# Patient Record
Sex: FEMALE | Race: WHITE
[De-identification: names, ages, dates, MRNs, and addresses within clinical notes are randomized per-mention and may not be internally consistent; named-entity substitution may affect disease eponyms.]

---

## 2018-04-25 ENCOUNTER — HOSPITAL ENCOUNTER (OUTPATIENT)
Dept: HOSPITAL 58 - RAD | Age: 48
Discharge: HOME | End: 2018-04-25
Attending: SPECIALIST

## 2018-04-25 DIAGNOSIS — G89.29: ICD-10-CM

## 2018-04-25 DIAGNOSIS — M54.17: ICD-10-CM

## 2018-04-25 DIAGNOSIS — M25.572: ICD-10-CM

## 2018-04-25 DIAGNOSIS — M25.50: ICD-10-CM

## 2018-04-25 DIAGNOSIS — M79.7: Primary | ICD-10-CM

## 2018-04-25 DIAGNOSIS — E66.9: ICD-10-CM

## 2018-04-25 DIAGNOSIS — M79.89: ICD-10-CM

## 2018-04-25 PROCEDURE — 86038 ANTINUCLEAR ANTIBODIES: CPT

## 2018-04-25 PROCEDURE — 36415 COLL VENOUS BLD VENIPUNCTURE: CPT

## 2018-04-25 PROCEDURE — 85025 COMPLETE CBC W/AUTO DIFF WBC: CPT

## 2018-04-25 PROCEDURE — 86430 RHEUMATOID FACTOR TEST QUAL: CPT

## 2018-04-25 PROCEDURE — 80061 LIPID PANEL: CPT

## 2018-04-25 PROCEDURE — 80053 COMPREHEN METABOLIC PANEL: CPT

## 2018-04-25 NOTE — DI
EXAM:  Five views of the lumbar spine. 

  

History:  Lumbar radiculopathy. 

  

Findings:  No acute fracture or subluxation of the lumbar spine.  Mild to moderate multilevel degener
ative disc space narrowing with endplate sclerosis and small osteophyte formation. 

  

Impression: 

1.  No acute osseous abnormality. 

2.  Mild degenerative disc disease

## 2018-04-25 NOTE — DI
EXAM:  Three views of the left ankle. 

  

History:  Left ankle pain. 

  

Findings:  No acute fracture or dislocation.  No abnormal calcifications or radiopaque foreign bodies
.  Calcaneal enthesiopathy.  Joint spaces are relatively preserved. 

  

Impression: 

1.  No acute osseous abnormality. 

2.  Calcaneal enthesiopathy

## 2018-04-25 NOTE — DI
EXAM:  Four views of the left knee. 

  

History:  Left knee pain. 

  

Findings:  No acute fracture or dislocation.  Moderate narrowing of the medial compartment.  Mild to 
moderate narrowing of the lateral and patellofemoral compartments with osteophyte formation.  No abno
rmal calcifications or radiopaque foreign bodies. 

  

Impression: 

1.  No acute osseous abnormality. 

2.  Tricompartmental osteoarthritis

## 2018-06-04 ENCOUNTER — HOSPITAL ENCOUNTER (OUTPATIENT)
Dept: HOSPITAL 58 - FCC-LAB | Age: 48
Discharge: HOME | End: 2018-06-04
Attending: SPECIALIST

## 2018-06-04 DIAGNOSIS — E66.9: ICD-10-CM

## 2018-06-04 DIAGNOSIS — Z79.899: ICD-10-CM

## 2018-06-04 DIAGNOSIS — M54.17: Primary | ICD-10-CM

## 2018-06-04 PROCEDURE — 81001 URINALYSIS AUTO W/SCOPE: CPT

## 2018-06-04 PROCEDURE — 36415 COLL VENOUS BLD VENIPUNCTURE: CPT

## 2018-06-04 PROCEDURE — 80053 COMPREHEN METABOLIC PANEL: CPT

## 2018-06-04 PROCEDURE — 87086 URINE CULTURE/COLONY COUNT: CPT

## 2018-07-10 ENCOUNTER — TRANSCRIBE ORDERS (OUTPATIENT)
Dept: ADMINISTRATIVE | Facility: HOSPITAL | Age: 48
End: 2018-07-10

## 2018-07-10 DIAGNOSIS — M23.92 DERANGEMENT OF COLLATERAL LIGAMENT OF LEFT KNEE: Primary | ICD-10-CM

## 2018-07-16 ENCOUNTER — HOSPITAL ENCOUNTER (OUTPATIENT)
Dept: MRI IMAGING | Facility: HOSPITAL | Age: 48
Discharge: HOME | End: 2018-07-16
Attending: ORTHOPAEDIC SURGERY | Admitting: ORTHOPAEDIC SURGERY

## 2018-07-16 DIAGNOSIS — M23.92 DERANGEMENT OF COLLATERAL LIGAMENT OF LEFT KNEE: ICD-10-CM

## 2018-07-16 PROCEDURE — 73721 MRI JNT OF LWR EXTRE W/O DYE: CPT

## 2018-08-02 RX ORDER — DICLOFENAC SODIUM 75 MG/1
75 TABLET, DELAYED RELEASE ORAL 2 TIMES DAILY
COMMUNITY

## 2018-08-02 RX ORDER — TRAMADOL HYDROCHLORIDE 50 MG/1
50 TABLET ORAL EVERY 8 HOURS PRN
COMMUNITY
End: 2018-09-02 | Stop reason: HOSPADM

## 2018-08-02 RX ORDER — CYCLOBENZAPRINE HCL 10 MG
10 TABLET ORAL NIGHTLY
COMMUNITY

## 2018-08-02 RX ORDER — TRAZODONE HYDROCHLORIDE 50 MG/1
50 TABLET ORAL NIGHTLY
COMMUNITY

## 2018-08-02 RX ORDER — PREGABALIN 50 MG/1
50 CAPSULE ORAL DAILY
COMMUNITY

## 2018-08-02 RX ORDER — PHENTERMINE HYDROCHLORIDE 37.5 MG/1
37.5 CAPSULE ORAL EVERY MORNING
COMMUNITY

## 2018-08-02 RX ORDER — NYSTATIN 100000 [USP'U]/G
1 POWDER TOPICAL DAILY PRN
COMMUNITY

## 2018-08-06 NOTE — DISCHARGE INSTRUCTIONS
DAY OF SURGERY INSTRUCTIONS        YOUR SURGEON:   DR. SVETLANA HINDS    PROCEDURE: LEFT TOTAL HIP ARTHROPLASTY    DATE OF SURGERY:   AUGUST 14, 2018    ARRIVAL TIME: AS DIRECTED BY OFFICE    DAY OF SURGERY TAKE ONLY THESE MEDICATIONS UNLESS OTHERWISE INSTRUCTED BY YOUR PHYSICIAN:    TRAMADOL (ULTRAM)              MANAGING PAIN AFTER SURGERY    We know you are probably wondering what your pain will be like after surgery.  Following surgery it is unrealistic to expect you will not have pain.   Pain is how our bodies let us know that something is wrong or cautions us to be careful.  That said, our goal is to make your pain tolerable.    Methods we may use to treat your pain include (oral or IV medications, PCAs, epidurals, nerve blocks, etc.)   While some procedures require IV pain medications for a short time after surgery, transitioning to pain medications by mouth allows for better management of pain.   Your nurse will encourage you to take oral pain medications whenever possible.  IV medications work almost immediately, but only last a short while.  Taking medications by mouth allows for a more constant level of medication in your blood stream for a longer period of time.      Once your pain is out of control it is harder to get back under control.  It is important you are aware when your next dose of pain medication is due.  If you are admitted, your nurse may write the time of your next dose on the white board in your room to help you remember.      We are interested in your pain and encourage you to inform us about aggravating factors during your visit.   Many times a simple repositioning every few hours can make a big difference.    If your physician says it is okay, do not let your pain prevent you from getting out of bed. Be sure to call your nurse for assistance prior to getting up so you do not fall.      Before surgery, please decide your tolerable pain goal.  These faces help describe the pain ratings  we use on a 0-10 scale.   Be prepared to tell us your goal and whether or not you take pain or anxiety medications at home.            BEFORE YOU COME TO THE HOSPITAL  (Pre-op instructions)  • Do not eat, drink, smoke or chew gum after midnight the night before surgery.  This also includes no mints.  • Morning of surgery take only the medicines you have been instructed with a sip of water unless otherwise instructed  by your physician.  • Do not shave, wear makeup or dark nail polish.  • Remove all jewelry including rings.  • Leave anything you consider valuable at home.  • Leave your suitcase in the car until after your surgery.  • Bring the following with you if applicable:  o Picture ID and insurance, Medicare or Medicaid cards  o Co-pay/deductible required by insurance (cash, check, credit card)  o Copy of advance directive, living will or power-of- documents if not brought to PAT  o CPAP or BIPAP mask and tubing  o Relaxation aids (MP3 player, book, magazine)  • On the day of surgery check in at registration located at the main entrance of the hospital.      Outpatient Surgery Guidelines, Adult  Outpatient procedures are those for which the person having the procedure is allowed to go home the same day as the procedure. Various procedures are done on an outpatient basis. You should follow some general guidelines if you will be having an outpatient procedure.  LET YOUR HEALTH CARE PROVIDER KNOW ABOUT:  · Any allergies you have.  · All medicines you are taking, including vitamins, herbs, eye drops, creams, and over-the-counter medicines.  · Previous problems you or members of your family have had with the use of anesthetics.  · Any blood disorders you have.  · Previous surgeries you have had.  · Medical conditions you have.  RISKS AND COMPLICATIONS  Your health care provider will discuss possible risks and complications with you before surgery. Common risks and complications include:    · Problems due to  the use of anesthetics.  · Blood loss and replacement (does not apply to minor surgical procedures).  · Temporary increase in pain due to surgery.  · Uncorrected pain or problems that the surgery was meant to correct.  · Infection.  · New damage.  BEFORE THE PROCEDURE  · Ask your health care provider about changing or stopping your regular medicines. You may need to stop taking certain medicines in the days or weeks before the procedure.  · Stop smoking at least 2 weeks before surgery. This lowers your risk for complications during and after surgery. Ask your health care provider for help with this if needed.  · Eat your usual meals and a light supper the day before surgery. Continue fluid intake. Do not drink alcohol.  · Do not eat or drink after midnight the night before your surgery.   · Arrange for someone to take you home and to stay with you for 24 hours after the procedure. Medicine given for your procedure may affect your ability to drive or to care for yourself.  · Call your health care provider's office if you develop an illness or problem that may prevent you from safely having your procedure.  AFTER THE PROCEDURE  After surgery, you will be taken to a recovery area, where your progress will be monitored. If there are no complications, you will be allowed to go home when you are awake, stable, and taking fluids well. You may have numbness around the surgical site. Healing will take some time. You will have tenderness at the surgical site and may have some swelling and bruising. You may also have some nausea.  HOME CARE INSTRUCTIONS  · Do not drive for 24 hours, or as directed by your health care provider. Do not drive while taking prescription pain medicines.  · Do not drink alcohol for 24 hours.  · Do not make important decisions or sign legal documents for 24 hours.  · You may resume a normal diet and activities as directed.  · Do not lift anything heavier than 10 pounds (4.5 kg) or play contact sports  until your health care provider says it is okay.  · Change your bandages (dressings) as directed.  · Only take over-the-counter or prescription medicines as directed by your health care provider.  · Follow up with your health care provider as directed.  SEEK MEDICAL CARE IF:  · You have increased bleeding (more than a small spot) from the surgical site.  · You have redness, swelling, or increasing pain in the wound.  · You see pus coming from the wound.  · You have a fever.  · You notice a bad smell coming from the wound or dressing.  · You feel lightheaded or faint.  · You develop a rash.  · You have trouble breathing.  · You develop allergies.  MAKE SURE YOU:  · Understand these instructions.  · Will watch your condition.  · Will get help right away if you are not doing well or get worse.     This information is not intended to replace advice given to you by your health care provider. Make sure you discuss any questions you have with your health care provider.     Document Released: 09/12/2002 Document Revised: 05/03/2016 Document Reviewed: 05/22/2014  [employer] Interactive Patient Education ©2016 [employer]       Fall Prevention in Hospitals, Adult  As a hospital patient, your condition and the treatments you receive can increase your risk for falls. Some additional risk factors for falls in a hospital include:  · Being in an unfamiliar environment.  · Being on bed rest.  · Your surgery.  · Taking certain medicines.  · Your tubing requirements, such as intravenous (IV) therapy or catheters.  It is important that you learn how to decrease fall risks while at the hospital. Below are important tips that can help prevent falls.  SAFETY TIPS FOR PREVENTING FALLS  Talk about your risk of falling.  · Ask your health care provider why you are at risk for falling. Is it your medicine, illness, tubing placement, or something else?  · Make a plan with your health care provider to keep you safe from falls.  · Ask your  health care provider or pharmacist about side effects of your medicines. Some medicines can make you dizzy or affect your coordination.  Ask for help.  · Ask for help before getting out of bed. You may need to press your call button.  · Ask for assistance in getting safely to the toilet.  · Ask for a walker or cane to be put at your bedside. Ask that most of the side rails on your bed be placed up before your health care provider leaves the room.  · Ask family or friends to sit with you.  · Ask for things that are out of your reach, such as your glasses, hearing aids, telephone, bedside table, or call button.  Follow these tips to avoid falling:  · Stay lying or seated, rather than standing, while waiting for help.  · Wear rubber-soled slippers or shoes whenever you walk in the hospital.  · Avoid quick, sudden movements.  ¨ Change positions slowly.  ¨ Sit on the side of your bed before standing.  ¨ Stand up slowly and wait before you start to walk.  · Let your health care provider know if there is a spill on the floor.  · Pay careful attention to the medical equipment, electrical cords, and tubes around you.  · When you need help, use your call button by your bed or in the bathroom. Wait for one of your health care providers to help you.  · If you feel dizzy or unsure of your footing, return to bed and wait for assistance.  · Avoid being distracted by the TV, telephone, or another person in your room.  · Do not lean or support yourself on rolling objects, such as IV poles or bedside tables.     This information is not intended to replace advice given to you by your health care provider. Make sure you discuss any questions you have with your health care provider.     Document Released: 12/15/2001 Document Revised: 01/08/2016 Document Reviewed: 08/25/2013  [employer] Interactive Patient Education ©2016 [employer]     .     Bactroban Nasal Ointment  If your physician ordered Bactroban nasal ointment please follow  these instructions.   Use:  Morning and night on the day before your surgery and the morning of your surgery.  How to use:  Assure the tube is open; squirt on Q tip provided and swab thoroughly in each nostril.  Why:  To reduce the bacteria count in your nose.      Preparing the Skin Before Surgery  Preparing or “prepping” skin before surgery can reduce the risk of infection at the surgical site. To make the process easier, [facility] has chosen  4% Chlorhexidine Gluconate (CHG) antiseptic solution.   The steps below outline the prepping process and should be carefully followed.                                                                                                                                                      Prep the skin at the following time(s):                                                        We recommend you shower the night before surgery, and again the morning of surgery with the 4% CHG antiseptic solution using half of the bottle each time.  Dress in clean clothes/sleepwear after showering.  See instructions below for application.              Do not apply any lotions or moisturizers.           Do not shave the area to be prepped for at least 2 days prior to surgery.        Clipping the hair may be done immediately prior to your surgery at the hospital if needed.    Directions:  Thoroughly rinse your body with water.  Apply 4% CHG to a cloth and wash skin gently, paying special attention to the operative site.  Rinse again thoroughly.   Once you have begun using this product do not apply anything else to your skin. If itching or redness persists, rinse affected areas and discontinue use.    When using this product:  • Keep out of eyes, ears, and mouth.  • If solution should contact these areas, rinse out promptly and thoroughly with water.  • For external use only.  • Do not use in genital area, on your face or head.    ____________       PATIENT/FAMILY/RESPONSIBLE PARTY VERBALIZES  UNDERSTANDING OF ABOVE EDUCATION.  COPY OF PAIN SCALE GIVEN AND REVIEWED WITH VERBALIZED UNDERSTANDING.

## 2018-08-07 ENCOUNTER — APPOINTMENT (OUTPATIENT)
Dept: PREADMISSION TESTING | Facility: HOSPITAL | Age: 48
End: 2018-08-07

## 2018-08-07 ENCOUNTER — HOSPITAL ENCOUNTER (OUTPATIENT)
Dept: GENERAL RADIOLOGY | Facility: HOSPITAL | Age: 48
Discharge: HOME | End: 2018-08-07
Admitting: ORTHOPAEDIC SURGERY

## 2018-08-07 VITALS
HEART RATE: 67 BPM | HEIGHT: 62 IN | WEIGHT: 258.82 LBS | OXYGEN SATURATION: 97 % | BODY MASS INDEX: 47.63 KG/M2 | DIASTOLIC BLOOD PRESSURE: 86 MMHG | SYSTOLIC BLOOD PRESSURE: 144 MMHG | RESPIRATION RATE: 18 BRPM

## 2018-08-07 LAB
ALBUMIN SERPL-MCNC: 4.1 G/DL (ref 3.5–5)
ALBUMIN/GLOB SERPL: 1.2 G/DL (ref 1.1–2.5)
ALP SERPL-CCNC: 102 U/L (ref 24–120)
ALT SERPL W P-5'-P-CCNC: 22 U/L (ref 0–54)
ANION GAP SERPL CALCULATED.3IONS-SCNC: 9 MMOL/L (ref 4–13)
AST SERPL-CCNC: 20 U/L (ref 7–45)
BACTERIA UR QL AUTO: ABNORMAL /HPF
BILIRUB SERPL-MCNC: 0.4 MG/DL (ref 0.1–1)
BILIRUB UR QL STRIP: NEGATIVE
BUN BLD-MCNC: 13 MG/DL (ref 5–21)
BUN/CREAT SERPL: 15.3 (ref 7–25)
CALCIUM SPEC-SCNC: 8.7 MG/DL (ref 8.4–10.4)
CHLORIDE SERPL-SCNC: 105 MMOL/L (ref 98–110)
CLARITY UR: CLEAR
CO2 SERPL-SCNC: 31 MMOL/L (ref 24–31)
COLOR UR: YELLOW
CREAT BLD-MCNC: 0.85 MG/DL (ref 0.5–1.4)
DEPRECATED RDW RBC AUTO: 42.5 FL (ref 40–54)
ERYTHROCYTE [DISTWIDTH] IN BLOOD BY AUTOMATED COUNT: 13.6 % (ref 12–15)
GFR SERPL CREATININE-BSD FRML MDRD: 71 ML/MIN/1.73
GLOBULIN UR ELPH-MCNC: 3.3 GM/DL
GLUCOSE BLD-MCNC: 108 MG/DL (ref 70–100)
GLUCOSE UR STRIP-MCNC: NEGATIVE MG/DL
HCT VFR BLD AUTO: 41.2 % (ref 37–47)
HGB BLD-MCNC: 13.1 G/DL (ref 12–16)
HGB UR QL STRIP.AUTO: NEGATIVE
HYALINE CASTS UR QL AUTO: ABNORMAL /LPF
INR PPP: 0.92 (ref 0.91–1.09)
KETONES UR QL STRIP: NEGATIVE
LEUKOCYTE ESTERASE UR QL STRIP.AUTO: ABNORMAL
MCH RBC QN AUTO: 27.3 PG (ref 28–32)
MCHC RBC AUTO-ENTMCNC: 31.8 G/DL (ref 33–36)
MCV RBC AUTO: 86 FL (ref 82–98)
NITRITE UR QL STRIP: NEGATIVE
PH UR STRIP.AUTO: 6 [PH] (ref 5–8)
PLATELET # BLD AUTO: 218 10*3/MM3 (ref 130–400)
PMV BLD AUTO: 10.5 FL (ref 6–12)
POTASSIUM BLD-SCNC: 4 MMOL/L (ref 3.5–5.3)
PROT SERPL-MCNC: 7.4 G/DL (ref 6.3–8.7)
PROT UR QL STRIP: NEGATIVE
PROTHROMBIN TIME: 12.6 SECONDS (ref 11.9–14.6)
RBC # BLD AUTO: 4.79 10*6/MM3 (ref 4.2–5.4)
RBC # UR: ABNORMAL /HPF
REF LAB TEST METHOD: ABNORMAL
SODIUM BLD-SCNC: 145 MMOL/L (ref 135–145)
SP GR UR STRIP: 1.02 (ref 1–1.03)
SQUAMOUS #/AREA URNS HPF: ABNORMAL /HPF
UROBILINOGEN UR QL STRIP: ABNORMAL
WBC NRBC COR # BLD: 7.93 10*3/MM3 (ref 4.8–10.8)
WBC UR QL AUTO: ABNORMAL /HPF

## 2018-08-07 PROCEDURE — 87086 URINE CULTURE/COLONY COUNT: CPT | Performed by: ORTHOPAEDIC SURGERY

## 2018-08-07 PROCEDURE — 85610 PROTHROMBIN TIME: CPT | Performed by: ORTHOPAEDIC SURGERY

## 2018-08-07 PROCEDURE — 85027 COMPLETE CBC AUTOMATED: CPT | Performed by: ORTHOPAEDIC SURGERY

## 2018-08-07 PROCEDURE — 93010 ELECTROCARDIOGRAM REPORT: CPT | Performed by: INTERNAL MEDICINE

## 2018-08-07 PROCEDURE — 80053 COMPREHEN METABOLIC PANEL: CPT | Performed by: ORTHOPAEDIC SURGERY

## 2018-08-07 PROCEDURE — 71046 X-RAY EXAM CHEST 2 VIEWS: CPT

## 2018-08-07 PROCEDURE — 93005 ELECTROCARDIOGRAM TRACING: CPT

## 2018-08-07 PROCEDURE — 81001 URINALYSIS AUTO W/SCOPE: CPT | Performed by: ORTHOPAEDIC SURGERY

## 2018-08-07 PROCEDURE — 36415 COLL VENOUS BLD VENIPUNCTURE: CPT

## 2018-08-07 ASSESSMENT — KOOS JR
KOOS JR SCORE: 31.307
KOOS JR SCORE: 22

## 2018-08-07 NOTE — PAT
PATIENT  ATTENDED TOTAL JOINT REPLACEMENT CLASS.  EDUCATION INCLUDED:  PAIN SCALE, INCENTIVE SPIROMETRY, DVT PREVENTION,  TURN-COUGH-DEEP BREATHING,  USE OF BACTROBAN AND CHG WASH.  PATIENT VERBALIZED UNDERSTANDING.

## 2018-08-09 LAB — BACTERIA SPEC AEROBE CULT: ABNORMAL

## 2018-08-14 ENCOUNTER — HOSPITAL ENCOUNTER (OUTPATIENT)
Facility: HOSPITAL | Age: 48
Setting detail: SURGERY ADMIT
Discharge: HOME | End: 2018-08-14
Attending: ORTHOPAEDIC SURGERY | Admitting: ORTHOPAEDIC SURGERY

## 2018-08-14 VITALS
DIASTOLIC BLOOD PRESSURE: 90 MMHG | TEMPERATURE: 97.2 F | OXYGEN SATURATION: 96 % | RESPIRATION RATE: 20 BRPM | HEART RATE: 79 BPM | SYSTOLIC BLOOD PRESSURE: 135 MMHG

## 2018-08-14 LAB
ABO GROUP BLD: NORMAL
BLD GP AB SCN SERPL QL: NEGATIVE
RH BLD: POSITIVE
T&S EXPIRATION DATE: NORMAL

## 2018-08-14 PROCEDURE — 86850 RBC ANTIBODY SCREEN: CPT | Performed by: ORTHOPAEDIC SURGERY

## 2018-08-14 PROCEDURE — 86900 BLOOD TYPING SEROLOGIC ABO: CPT | Performed by: ORTHOPAEDIC SURGERY

## 2018-08-14 PROCEDURE — 86901 BLOOD TYPING SEROLOGIC RH(D): CPT | Performed by: ORTHOPAEDIC SURGERY

## 2018-08-14 RX ORDER — SODIUM CHLORIDE 0.9 % (FLUSH) 0.9 %
1-10 SYRINGE (ML) INJECTION AS NEEDED
Status: DISCONTINUED | OUTPATIENT
Start: 2018-08-14 | End: 2018-08-14 | Stop reason: HOSPADM

## 2018-08-14 RX ORDER — SODIUM CHLORIDE, SODIUM LACTATE, POTASSIUM CHLORIDE, CALCIUM CHLORIDE 600; 310; 30; 20 MG/100ML; MG/100ML; MG/100ML; MG/100ML
100 INJECTION, SOLUTION INTRAVENOUS CONTINUOUS PRN
Status: DISCONTINUED | OUTPATIENT
Start: 2018-08-14 | End: 2018-08-14 | Stop reason: HOSPADM

## 2018-08-14 RX ADMIN — LIDOCAINE HYDROCHLORIDE 0.5 ML: 10 INJECTION, SOLUTION EPIDURAL; INFILTRATION; INTRACAUDAL; PERINEURAL at 12:54

## 2018-08-14 RX ADMIN — SODIUM CHLORIDE, POTASSIUM CHLORIDE, SODIUM LACTATE AND CALCIUM CHLORIDE 100 ML/HR: 600; 310; 30; 20 INJECTION, SOLUTION INTRAVENOUS at 12:54

## 2018-08-30 RX ORDER — HYDROCODONE BITARTRATE AND ACETAMINOPHEN 7.5; 325 MG/1; MG/1
1 TABLET ORAL EVERY 8 HOURS PRN
Status: ON HOLD | COMMUNITY
End: 2018-09-02

## 2018-08-31 ENCOUNTER — HOSPITAL ENCOUNTER (OUTPATIENT)
Facility: HOSPITAL | Age: 48
Discharge: HOME HEALTH | End: 2018-09-02
Attending: ORTHOPAEDIC SURGERY | Admitting: ORTHOPAEDIC SURGERY

## 2018-08-31 ENCOUNTER — ANESTHESIA EVENT (OUTPATIENT)
Dept: PERIOP | Facility: HOSPITAL | Age: 48
End: 2018-08-31

## 2018-08-31 ENCOUNTER — ANESTHESIA (OUTPATIENT)
Dept: PERIOP | Facility: HOSPITAL | Age: 48
End: 2018-08-31

## 2018-08-31 DIAGNOSIS — M17.12 OSTEOARTHRITIS OF LEFT KNEE: ICD-10-CM

## 2018-08-31 DIAGNOSIS — Z47.1 AFTERCARE FOLLOWING LEFT KNEE JOINT REPLACEMENT SURGERY: ICD-10-CM

## 2018-08-31 DIAGNOSIS — Z96.652 AFTERCARE FOLLOWING LEFT KNEE JOINT REPLACEMENT SURGERY: ICD-10-CM

## 2018-08-31 DIAGNOSIS — M17.12 PRIMARY OSTEOARTHRITIS OF LEFT KNEE: Primary | ICD-10-CM

## 2018-08-31 PROCEDURE — 86901 BLOOD TYPING SEROLOGIC RH(D): CPT | Performed by: ORTHOPAEDIC SURGERY

## 2018-08-31 PROCEDURE — 25010000002 FENTANYL CITRATE (PF) 100 MCG/2ML SOLUTION: Performed by: ANESTHESIOLOGY

## 2018-08-31 PROCEDURE — 25010000002 ONDANSETRON PER 1 MG: Performed by: ORTHOPAEDIC SURGERY

## 2018-08-31 PROCEDURE — 25010000002 FENTANYL CITRATE (PF) 100 MCG/2ML SOLUTION: Performed by: NURSE ANESTHETIST, CERTIFIED REGISTERED

## 2018-08-31 PROCEDURE — 25010000002 VANCOMYCIN PER 500 MG: Performed by: ORTHOPAEDIC SURGERY

## 2018-08-31 PROCEDURE — 25010000002 MORPHINE SULFATE (PF) 2 MG/ML SOLUTION: Performed by: ANESTHESIOLOGY

## 2018-08-31 PROCEDURE — 25010000002 MIDAZOLAM PER 1 MG: Performed by: ANESTHESIOLOGY

## 2018-08-31 PROCEDURE — C1713 ANCHOR/SCREW BN/BN,TIS/BN: HCPCS | Performed by: ORTHOPAEDIC SURGERY

## 2018-08-31 PROCEDURE — 88311 DECALCIFY TISSUE: CPT | Performed by: ORTHOPAEDIC SURGERY

## 2018-08-31 PROCEDURE — 25010000002 ONDANSETRON PER 1 MG: Performed by: ANESTHESIOLOGY

## 2018-08-31 PROCEDURE — 25010000002 ROPIVACAINE PER 1 MG: Performed by: ANESTHESIOLOGY

## 2018-08-31 PROCEDURE — A9270 NON-COVERED ITEM OR SERVICE: HCPCS | Performed by: ANESTHESIOLOGY

## 2018-08-31 PROCEDURE — 25010000002 METOCLOPRAMIDE PER 10 MG: Performed by: ANESTHESIOLOGY

## 2018-08-31 PROCEDURE — 86850 RBC ANTIBODY SCREEN: CPT | Performed by: ORTHOPAEDIC SURGERY

## 2018-08-31 PROCEDURE — 63710000001 OXYCODONE-ACETAMINOPHEN 10-325 MG TABLET: Performed by: ANESTHESIOLOGY

## 2018-08-31 PROCEDURE — 25010000002 PROPOFOL 10 MG/ML EMULSION: Performed by: NURSE ANESTHETIST, CERTIFIED REGISTERED

## 2018-08-31 PROCEDURE — 25010000002 ONDANSETRON PER 1 MG: Performed by: NURSE ANESTHETIST, CERTIFIED REGISTERED

## 2018-08-31 PROCEDURE — 94799 UNLISTED PULMONARY SVC/PX: CPT

## 2018-08-31 PROCEDURE — 88304 TISSUE EXAM BY PATHOLOGIST: CPT | Performed by: ORTHOPAEDIC SURGERY

## 2018-08-31 PROCEDURE — 86900 BLOOD TYPING SEROLOGIC ABO: CPT | Performed by: ORTHOPAEDIC SURGERY

## 2018-08-31 PROCEDURE — 25010000002 SUCCINYLCHOLINE PER 20 MG: Performed by: NURSE ANESTHETIST, CERTIFIED REGISTERED

## 2018-08-31 PROCEDURE — 25010000003 MORPHINE PER 100 MG: Performed by: ORTHOPAEDIC SURGERY

## 2018-08-31 PROCEDURE — C1776 JOINT DEVICE (IMPLANTABLE): HCPCS | Performed by: ORTHOPAEDIC SURGERY

## 2018-08-31 PROCEDURE — 63710000001 ACETAMINOPHEN 500 MG TABLET: Performed by: ANESTHESIOLOGY

## 2018-08-31 DEVICE — IMPLANTABLE DEVICE: Type: IMPLANTABLE DEVICE | Site: KNEE | Status: FUNCTIONAL

## 2018-08-31 DEVICE — IMPLANTABLE DEVICE: Type: IMPLANTABLE DEVICE | Status: FUNCTIONAL

## 2018-08-31 DEVICE — TOTL KN HI DEMAND STRYKER: Type: IMPLANTABLE DEVICE | Status: FUNCTIONAL

## 2018-08-31 DEVICE — CMT BONE SIMPLEX RO FUL DOSE: Type: IMPLANTABLE DEVICE | Site: KNEE | Status: FUNCTIONAL

## 2018-08-31 RX ORDER — NALOXONE HCL 0.4 MG/ML
0.1 VIAL (ML) INJECTION
Status: DISCONTINUED | OUTPATIENT
Start: 2018-08-31 | End: 2018-09-02 | Stop reason: HOSPADM

## 2018-08-31 RX ORDER — HYDRALAZINE HYDROCHLORIDE 20 MG/ML
5 INJECTION INTRAMUSCULAR; INTRAVENOUS
Status: DISCONTINUED | OUTPATIENT
Start: 2018-08-31 | End: 2018-08-31 | Stop reason: HOSPADM

## 2018-08-31 RX ORDER — ASPIRIN 325 MG
325 TABLET ORAL DAILY
Status: DISCONTINUED | OUTPATIENT
Start: 2018-09-01 | End: 2018-09-02 | Stop reason: HOSPADM

## 2018-08-31 RX ORDER — MAGNESIUM HYDROXIDE 1200 MG/15ML
LIQUID ORAL AS NEEDED
Status: DISCONTINUED | OUTPATIENT
Start: 2018-08-31 | End: 2018-08-31 | Stop reason: HOSPADM

## 2018-08-31 RX ORDER — MORPHINE SULFATE 1 MG/ML
INJECTION INTRAVENOUS CONTINUOUS
Status: DISCONTINUED | OUTPATIENT
Start: 2018-08-31 | End: 2018-09-02 | Stop reason: HOSPADM

## 2018-08-31 RX ORDER — PREGABALIN 50 MG/1
50 CAPSULE ORAL DAILY
Status: DISCONTINUED | OUTPATIENT
Start: 2018-09-01 | End: 2018-09-02 | Stop reason: HOSPADM

## 2018-08-31 RX ORDER — LIDOCAINE HYDROCHLORIDE 20 MG/ML
INJECTION, SOLUTION INFILTRATION; PERINEURAL AS NEEDED
Status: DISCONTINUED | OUTPATIENT
Start: 2018-08-31 | End: 2018-08-31 | Stop reason: SURG

## 2018-08-31 RX ORDER — ROCURONIUM BROMIDE 10 MG/ML
INJECTION, SOLUTION INTRAVENOUS AS NEEDED
Status: DISCONTINUED | OUTPATIENT
Start: 2018-08-31 | End: 2018-08-31 | Stop reason: SURG

## 2018-08-31 RX ORDER — TRANEXAMIC ACID 100 MG/ML
INJECTION, SOLUTION INTRAVENOUS AS NEEDED
Status: DISCONTINUED | OUTPATIENT
Start: 2018-08-31 | End: 2018-08-31 | Stop reason: SURG

## 2018-08-31 RX ORDER — METOCLOPRAMIDE HYDROCHLORIDE 5 MG/ML
5 INJECTION INTRAMUSCULAR; INTRAVENOUS
Status: DISCONTINUED | OUTPATIENT
Start: 2018-08-31 | End: 2018-08-31 | Stop reason: HOSPADM

## 2018-08-31 RX ORDER — MIDAZOLAM HYDROCHLORIDE 1 MG/ML
1 INJECTION INTRAMUSCULAR; INTRAVENOUS
Status: DISCONTINUED | OUTPATIENT
Start: 2018-08-31 | End: 2018-08-31 | Stop reason: HOSPADM

## 2018-08-31 RX ORDER — SODIUM CHLORIDE 0.9 % (FLUSH) 0.9 %
1-10 SYRINGE (ML) INJECTION AS NEEDED
Status: DISCONTINUED | OUTPATIENT
Start: 2018-08-31 | End: 2018-08-31 | Stop reason: HOSPADM

## 2018-08-31 RX ORDER — FENTANYL CITRATE 50 UG/ML
25 INJECTION, SOLUTION INTRAMUSCULAR; INTRAVENOUS AS NEEDED
Status: DISCONTINUED | OUTPATIENT
Start: 2018-08-31 | End: 2018-08-31 | Stop reason: HOSPADM

## 2018-08-31 RX ORDER — SODIUM CHLORIDE, SODIUM LACTATE, POTASSIUM CHLORIDE, CALCIUM CHLORIDE 600; 310; 30; 20 MG/100ML; MG/100ML; MG/100ML; MG/100ML
50 INJECTION, SOLUTION INTRAVENOUS CONTINUOUS PRN
Status: DISCONTINUED | OUTPATIENT
Start: 2018-08-31 | End: 2018-09-02 | Stop reason: HOSPADM

## 2018-08-31 RX ORDER — ONDANSETRON 4 MG/1
4 TABLET, ORALLY DISINTEGRATING ORAL EVERY 6 HOURS PRN
Status: DISCONTINUED | OUTPATIENT
Start: 2018-08-31 | End: 2018-09-02 | Stop reason: HOSPADM

## 2018-08-31 RX ORDER — SODIUM CHLORIDE, SODIUM LACTATE, POTASSIUM CHLORIDE, CALCIUM CHLORIDE 600; 310; 30; 20 MG/100ML; MG/100ML; MG/100ML; MG/100ML
100 INJECTION, SOLUTION INTRAVENOUS CONTINUOUS
Status: DISCONTINUED | OUTPATIENT
Start: 2018-08-31 | End: 2018-08-31

## 2018-08-31 RX ORDER — DOCUSATE SODIUM 100 MG/1
100 CAPSULE, LIQUID FILLED ORAL 2 TIMES DAILY PRN
Status: DISCONTINUED | OUTPATIENT
Start: 2018-08-31 | End: 2018-09-02 | Stop reason: HOSPADM

## 2018-08-31 RX ORDER — LABETALOL HYDROCHLORIDE 5 MG/ML
5 INJECTION, SOLUTION INTRAVENOUS
Status: DISCONTINUED | OUTPATIENT
Start: 2018-08-31 | End: 2018-08-31 | Stop reason: HOSPADM

## 2018-08-31 RX ORDER — ONDANSETRON 2 MG/ML
4 INJECTION INTRAMUSCULAR; INTRAVENOUS AS NEEDED
Status: DISCONTINUED | OUTPATIENT
Start: 2018-08-31 | End: 2018-08-31 | Stop reason: HOSPADM

## 2018-08-31 RX ORDER — VANCOMYCIN HYDROCHLORIDE 1 G/200ML
1 INJECTION, SOLUTION INTRAVENOUS ONCE
Status: DISCONTINUED | OUTPATIENT
Start: 2018-08-31 | End: 2018-08-31

## 2018-08-31 RX ORDER — MIDAZOLAM HYDROCHLORIDE 1 MG/ML
2 INJECTION INTRAMUSCULAR; INTRAVENOUS
Status: DISCONTINUED | OUTPATIENT
Start: 2018-08-31 | End: 2018-08-31 | Stop reason: HOSPADM

## 2018-08-31 RX ORDER — ONDANSETRON 2 MG/ML
4 INJECTION INTRAMUSCULAR; INTRAVENOUS EVERY 6 HOURS PRN
Status: DISCONTINUED | OUTPATIENT
Start: 2018-08-31 | End: 2018-09-02 | Stop reason: HOSPADM

## 2018-08-31 RX ORDER — MEPERIDINE HYDROCHLORIDE 25 MG/ML
12.5 INJECTION INTRAMUSCULAR; INTRAVENOUS; SUBCUTANEOUS
Status: DISCONTINUED | OUTPATIENT
Start: 2018-08-31 | End: 2018-08-31 | Stop reason: HOSPADM

## 2018-08-31 RX ORDER — ONDANSETRON 2 MG/ML
INJECTION INTRAMUSCULAR; INTRAVENOUS AS NEEDED
Status: DISCONTINUED | OUTPATIENT
Start: 2018-08-31 | End: 2018-08-31 | Stop reason: SURG

## 2018-08-31 RX ORDER — ONDANSETRON 4 MG/1
4 TABLET, FILM COATED ORAL EVERY 6 HOURS PRN
Status: DISCONTINUED | OUTPATIENT
Start: 2018-08-31 | End: 2018-09-02 | Stop reason: HOSPADM

## 2018-08-31 RX ORDER — SODIUM CHLORIDE, SODIUM LACTATE, POTASSIUM CHLORIDE, CALCIUM CHLORIDE 600; 310; 30; 20 MG/100ML; MG/100ML; MG/100ML; MG/100ML
100 INJECTION, SOLUTION INTRAVENOUS CONTINUOUS PRN
Status: DISCONTINUED | OUTPATIENT
Start: 2018-08-31 | End: 2018-08-31

## 2018-08-31 RX ORDER — FLUMAZENIL 0.1 MG/ML
0.2 INJECTION INTRAVENOUS AS NEEDED
Status: DISCONTINUED | OUTPATIENT
Start: 2018-08-31 | End: 2018-08-31 | Stop reason: HOSPADM

## 2018-08-31 RX ORDER — PROPOFOL 10 MG/ML
VIAL (ML) INTRAVENOUS AS NEEDED
Status: DISCONTINUED | OUTPATIENT
Start: 2018-08-31 | End: 2018-08-31 | Stop reason: SURG

## 2018-08-31 RX ORDER — SUCCINYLCHOLINE CHLORIDE 20 MG/ML
INJECTION INTRAMUSCULAR; INTRAVENOUS AS NEEDED
Status: DISCONTINUED | OUTPATIENT
Start: 2018-08-31 | End: 2018-08-31 | Stop reason: SURG

## 2018-08-31 RX ORDER — MORPHINE SULFATE 2 MG/ML
2 INJECTION, SOLUTION INTRAMUSCULAR; INTRAVENOUS
Status: COMPLETED | OUTPATIENT
Start: 2018-08-31 | End: 2018-08-31

## 2018-08-31 RX ORDER — ROPIVACAINE HYDROCHLORIDE 5 MG/ML
INJECTION, SOLUTION EPIDURAL; INFILTRATION; PERINEURAL AS NEEDED
Status: DISCONTINUED | OUTPATIENT
Start: 2018-08-31 | End: 2018-08-31 | Stop reason: SURG

## 2018-08-31 RX ORDER — HYDROCODONE BITARTRATE AND ACETAMINOPHEN 10; 325 MG/1; MG/1
1 TABLET ORAL EVERY 4 HOURS PRN
Status: DISCONTINUED | OUTPATIENT
Start: 2018-08-31 | End: 2018-09-02 | Stop reason: HOSPADM

## 2018-08-31 RX ORDER — ACETAMINOPHEN 500 MG
1000 TABLET ORAL ONCE
Status: COMPLETED | OUTPATIENT
Start: 2018-08-31 | End: 2018-08-31

## 2018-08-31 RX ORDER — IPRATROPIUM BROMIDE AND ALBUTEROL SULFATE 2.5; .5 MG/3ML; MG/3ML
3 SOLUTION RESPIRATORY (INHALATION) ONCE AS NEEDED
Status: DISCONTINUED | OUTPATIENT
Start: 2018-08-31 | End: 2018-08-31 | Stop reason: HOSPADM

## 2018-08-31 RX ORDER — OXYCODONE AND ACETAMINOPHEN 10; 325 MG/1; MG/1
1 TABLET ORAL ONCE AS NEEDED
Status: COMPLETED | OUTPATIENT
Start: 2018-08-31 | End: 2018-08-31

## 2018-08-31 RX ORDER — FENTANYL CITRATE 50 UG/ML
INJECTION, SOLUTION INTRAMUSCULAR; INTRAVENOUS AS NEEDED
Status: DISCONTINUED | OUTPATIENT
Start: 2018-08-31 | End: 2018-08-31 | Stop reason: SURG

## 2018-08-31 RX ORDER — NALOXONE HCL 0.4 MG/ML
0.04 VIAL (ML) INJECTION AS NEEDED
Status: DISCONTINUED | OUTPATIENT
Start: 2018-08-31 | End: 2018-08-31 | Stop reason: HOSPADM

## 2018-08-31 RX ADMIN — FENTANYL CITRATE 100 MCG: 50 INJECTION, SOLUTION INTRAMUSCULAR; INTRAVENOUS at 11:46

## 2018-08-31 RX ADMIN — METOCLOPRAMIDE 5 MG: 5 INJECTION, SOLUTION INTRAMUSCULAR; INTRAVENOUS at 14:39

## 2018-08-31 RX ADMIN — TRANEXAMIC ACID 1000 MG: 100 INJECTION, SOLUTION INTRAVENOUS at 13:40

## 2018-08-31 RX ADMIN — ROPIVACAINE HYDROCHLORIDE 20 ML: 5 INJECTION, SOLUTION EPIDURAL; INFILTRATION; PERINEURAL at 11:21

## 2018-08-31 RX ADMIN — FENTANYL CITRATE 100 MCG: 50 INJECTION, SOLUTION INTRAMUSCULAR; INTRAVENOUS at 12:38

## 2018-08-31 RX ADMIN — MORPHINE SULFATE: 25 INJECTION, SOLUTION, CONCENTRATE INTRAVENOUS at 22:46

## 2018-08-31 RX ADMIN — TRANEXAMIC ACID 1000 MG: 100 INJECTION, SOLUTION INTRAVENOUS at 12:10

## 2018-08-31 RX ADMIN — MORPHINE SULFATE 2 MG: 2 INJECTION, SOLUTION INTRAMUSCULAR; INTRAVENOUS at 15:15

## 2018-08-31 RX ADMIN — LIDOCAINE HYDROCHLORIDE 0.5 ML: 10 INJECTION, SOLUTION EPIDURAL; INFILTRATION; INTRACAUDAL; PERINEURAL at 10:35

## 2018-08-31 RX ADMIN — SUCCINYLCHOLINE CHLORIDE 120 MG: 20 INJECTION, SOLUTION INTRAMUSCULAR; INTRAVENOUS at 11:48

## 2018-08-31 RX ADMIN — LIDOCAINE HYDROCHLORIDE 50 MG: 20 INJECTION, SOLUTION INFILTRATION; PERINEURAL at 11:48

## 2018-08-31 RX ADMIN — ONDANSETRON 4 MG: 2 INJECTION, SOLUTION INTRAMUSCULAR; INTRAVENOUS at 22:14

## 2018-08-31 RX ADMIN — ROCURONIUM BROMIDE 10 MG: 10 INJECTION INTRAVENOUS at 11:47

## 2018-08-31 RX ADMIN — ONDANSETRON HYDROCHLORIDE 4 MG: 2 INJECTION, SOLUTION INTRAMUSCULAR; INTRAVENOUS at 14:16

## 2018-08-31 RX ADMIN — FENTANYL CITRATE 100 MCG: 50 INJECTION, SOLUTION INTRAMUSCULAR; INTRAVENOUS at 14:35

## 2018-08-31 RX ADMIN — ACETAMINOPHEN 1000 MG: 500 TABLET, FILM COATED ORAL at 11:17

## 2018-08-31 RX ADMIN — MORPHINE SULFATE: 25 INJECTION, SOLUTION, CONCENTRATE INTRAVENOUS at 16:28

## 2018-08-31 RX ADMIN — SODIUM CHLORIDE, POTASSIUM CHLORIDE, SODIUM LACTATE AND CALCIUM CHLORIDE: 600; 310; 30; 20 INJECTION, SOLUTION INTRAVENOUS at 13:05

## 2018-08-31 RX ADMIN — OXYCODONE HYDROCHLORIDE AND ACETAMINOPHEN 1 TABLET: 10; 325 TABLET ORAL at 15:40

## 2018-08-31 RX ADMIN — MORPHINE SULFATE 2 MG: 2 INJECTION, SOLUTION INTRAMUSCULAR; INTRAVENOUS at 15:05

## 2018-08-31 RX ADMIN — MORPHINE SULFATE 2 MG: 2 INJECTION, SOLUTION INTRAMUSCULAR; INTRAVENOUS at 14:45

## 2018-08-31 RX ADMIN — SODIUM CHLORIDE, POTASSIUM CHLORIDE, SODIUM LACTATE AND CALCIUM CHLORIDE 100 ML/HR: 600; 310; 30; 20 INJECTION, SOLUTION INTRAVENOUS at 10:35

## 2018-08-31 RX ADMIN — MORPHINE SULFATE 2 MG: 2 INJECTION, SOLUTION INTRAMUSCULAR; INTRAVENOUS at 14:55

## 2018-08-31 RX ADMIN — FENTANYL CITRATE 100 MCG: 50 INJECTION, SOLUTION INTRAMUSCULAR; INTRAVENOUS at 14:13

## 2018-08-31 RX ADMIN — VANCOMYCIN HYDROCHLORIDE 1000 MG: 1 INJECTION, POWDER, LYOPHILIZED, FOR SOLUTION INTRAVENOUS at 11:39

## 2018-08-31 RX ADMIN — PROPOFOL 150 MG: 10 INJECTION, EMULSION INTRAVENOUS at 11:50

## 2018-08-31 RX ADMIN — MEPERIDINE HYDROCHLORIDE 25 MG: 25 INJECTION INTRAMUSCULAR; INTRAVENOUS; SUBCUTANEOUS at 14:16

## 2018-08-31 RX ADMIN — MIDAZOLAM HYDROCHLORIDE 2 MG: 1 INJECTION, SOLUTION INTRAMUSCULAR; INTRAVENOUS at 11:20

## 2018-08-31 RX ADMIN — MORPHINE SULFATE 2 MG: 2 INJECTION, SOLUTION INTRAMUSCULAR; INTRAVENOUS at 15:25

## 2018-08-31 RX ADMIN — ONDANSETRON HYDROCHLORIDE 4 MG: 2 SOLUTION INTRAMUSCULAR; INTRAVENOUS at 13:50

## 2018-09-01 LAB
HCT VFR BLD AUTO: 36.3 % (ref 37–47)
HGB BLD-MCNC: 11.5 G/DL (ref 12–16)

## 2018-09-01 PROCEDURE — A9270 NON-COVERED ITEM OR SERVICE: HCPCS | Performed by: ORTHOPAEDIC SURGERY

## 2018-09-01 PROCEDURE — 97161 PT EVAL LOW COMPLEX 20 MIN: CPT

## 2018-09-01 PROCEDURE — 85014 HEMATOCRIT: CPT | Performed by: ORTHOPAEDIC SURGERY

## 2018-09-01 PROCEDURE — 97110 THERAPEUTIC EXERCISES: CPT

## 2018-09-01 PROCEDURE — 25010000003 MORPHINE PER 100 MG: Performed by: ORTHOPAEDIC SURGERY

## 2018-09-01 PROCEDURE — 97166 OT EVAL MOD COMPLEX 45 MIN: CPT | Performed by: OCCUPATIONAL THERAPIST

## 2018-09-01 PROCEDURE — 25010000002 ONDANSETRON PER 1 MG: Performed by: ORTHOPAEDIC SURGERY

## 2018-09-01 PROCEDURE — 85018 HEMOGLOBIN: CPT | Performed by: ORTHOPAEDIC SURGERY

## 2018-09-01 PROCEDURE — G8988 SELF CARE GOAL STATUS: HCPCS | Performed by: OCCUPATIONAL THERAPIST

## 2018-09-01 PROCEDURE — 63710000001 PREGABALIN 50 MG CAPSULE: Performed by: ORTHOPAEDIC SURGERY

## 2018-09-01 PROCEDURE — 63710000001 ASPIRIN 325 MG TABLET: Performed by: ORTHOPAEDIC SURGERY

## 2018-09-01 PROCEDURE — G8987 SELF CARE CURRENT STATUS: HCPCS | Performed by: OCCUPATIONAL THERAPIST

## 2018-09-01 PROCEDURE — G8978 MOBILITY CURRENT STATUS: HCPCS

## 2018-09-01 PROCEDURE — 25010000002 VANCOMYCIN PER 500 MG: Performed by: ORTHOPAEDIC SURGERY

## 2018-09-01 PROCEDURE — G8979 MOBILITY GOAL STATUS: HCPCS

## 2018-09-01 PROCEDURE — 97116 GAIT TRAINING THERAPY: CPT

## 2018-09-01 PROCEDURE — 63710000001 HYDROCODONE-ACETAMINOPHEN 10-325 MG TABLET: Performed by: ORTHOPAEDIC SURGERY

## 2018-09-01 RX ADMIN — PREGABALIN 50 MG: 50 CAPSULE ORAL at 08:28

## 2018-09-01 RX ADMIN — MORPHINE SULFATE: 25 INJECTION, SOLUTION, CONCENTRATE INTRAVENOUS at 09:39

## 2018-09-01 RX ADMIN — HYDROCODONE BITARTRATE AND ACETAMINOPHEN 1 TABLET: 10; 325 TABLET ORAL at 04:03

## 2018-09-01 RX ADMIN — HYDROCODONE BITARTRATE AND ACETAMINOPHEN 1 TABLET: 10; 325 TABLET ORAL at 08:28

## 2018-09-01 RX ADMIN — VANCOMYCIN HYDROCHLORIDE 1750 MG: 1 INJECTION, POWDER, LYOPHILIZED, FOR SOLUTION INTRAVENOUS at 00:06

## 2018-09-01 RX ADMIN — HYDROCODONE BITARTRATE AND ACETAMINOPHEN 1 TABLET: 10; 325 TABLET ORAL at 00:06

## 2018-09-01 RX ADMIN — SODIUM CHLORIDE, POTASSIUM CHLORIDE, SODIUM LACTATE AND CALCIUM CHLORIDE 50 ML/HR: 600; 310; 30; 20 INJECTION, SOLUTION INTRAVENOUS at 15:08

## 2018-09-01 RX ADMIN — ONDANSETRON 4 MG: 2 INJECTION, SOLUTION INTRAMUSCULAR; INTRAVENOUS at 08:51

## 2018-09-01 RX ADMIN — ASPIRIN 325 MG: 325 TABLET ORAL at 08:28

## 2018-09-01 RX ADMIN — HYDROCODONE BITARTRATE AND ACETAMINOPHEN 1 TABLET: 10; 325 TABLET ORAL at 13:48

## 2018-09-01 RX ADMIN — HYDROCODONE BITARTRATE AND ACETAMINOPHEN 1 TABLET: 10; 325 TABLET ORAL at 18:42

## 2018-09-01 NOTE — PLAN OF CARE
Problem: Patient Care Overview  Goal: Plan of Care Review  Outcome: Ongoing (interventions implemented as appropriate)   09/01/18 0659   Coping/Psychosocial   Plan of Care Reviewed With patient   Plan of Care Review   Progress no change   OTHER   Outcome Summary Pt c/o moderate to severe pain, PRN PO pain medication given and PCA in use. Dressing to left knee C/D/I. Pt assisted with repositioning. Safety maintained, VSS, will continue to monitor.       Problem: Fall Risk (Adult)  Goal: Identify Related Risk Factors and Signs and Symptoms  Outcome: Outcome(s) achieved Date Met: 09/01/18    Goal: Absence of Fall  Outcome: Ongoing (interventions implemented as appropriate)      Problem: Knee Arthroplasty (Total, Partial) (Adult)  Goal: Signs and Symptoms of Listed Potential Problems Will be Absent, Minimized or Managed (Knee Arthroplasty)  Outcome: Ongoing (interventions implemented as appropriate)

## 2018-09-01 NOTE — THERAPY EVALUATION
Acute Care - Physical Therapy Initial Evaluation  [facility]     Patient Name: Mayda Webster  : 1970  MRN: [id number]  Today's Date: 2018   Onset of Illness/Injury or Date of Surgery: 18  Date of Referral to PT: 18  Referring Physician: Dr. White      Admit Date: 2018    Visit Dx:     ICD-10-CM ICD-9-CM   1. Osteoarthritis of left knee M17.12 715.96   2. Aftercare following left knee joint replacement surgery Z47.1 V54.81    Z96.652 V43.65     Patient Active Problem List   Diagnosis   • Primary osteoarthritis of left knee     Past Medical History:   Diagnosis Date   • Arthritis    • Joint pain    • Lower back pain      Past Surgical History:   Procedure Laterality Date   • CYST REMOVAL Right     Right upper leg   • HYSTERECTOMY          PT ASSESSMENT (last 12 hours)      Physical Therapy Evaluation     Row Name 18 0824          PT Evaluation Time/Intention    Document Type evaluation  -RS     Mode of Treatment physical therapy  -RS     Patient Effort good  -RS     Symptoms Noted During/After Treatment increased pain;fatigue  -RS     Comment patient was nauseated post treatment  -RS     Row Name 18 0824          General Information    Patient Profile Reviewed? yes  -RS     Onset of Illness/Injury or Date of Surgery 18  -RS     Referring Physician Dr. White  -RS     Patient Observations alert;lethargic;agree to therapy  -RS     Equipment Currently Used at Home cane, straight;bath bench;walker, rolling  -RS     Pertinent History of Current Functional Problem Chronic left knee pain with history of falls due to pain and weakness.  L TKA on 2018  -RS     Existing Precautions/Restrictions fall  -RS     Equipment Issued to Patient walker, front wheeled  -RS     Risks Reviewed patient:;LOB;nausea/vomiting;dizziness;increased discomfort;change in vital signs  -RS     Benefits Reviewed patient:;improve function;increase independence;increase strength;increase  balance;decrease pain;decrease risk of DVT  -RS     Barriers to Rehab previous functional deficit  -RS     Row Name 09/01/18 0824          Relationship/Environment    Primary Source of Support/Comfort child(werner)  -RS     Lives With alone  -RS     Family Caregiver if Needed child(werner), adult  -RS     Primary Roles/Responsibilities disabled  -RS     Row Name 09/01/18 0824          Resource/Environmental Concerns    Current Living Arrangements home/apartment/condo  -RS     Resource/Environmental Concerns none  -RS     Row Name 09/01/18 0824          Cognitive Assessment/Interventions    Additional Documentation Cognitive Assessment/Intervention (Group)  -RS     Row Name 09/01/18 0824          Cognitive Assessment/Intervention- PT/OT    Affect/Mental Status (Cognitive) WNL  -RS     Follows Commands (Cognition) WNL  -RS     Cognitive Function (Cognitive) WNL  -RS     Personal Safety Interventions fall prevention program maintained;gait belt;nonskid shoes/slippers when out of bed;supervised activity  -RS     Row Name 09/01/18 0824          Safety Issues, Functional Mobility    Impairments Affecting Function (Mobility) pain;balance;range of motion (ROM);strength;endurance/activity tolerance  -RS     Row Name 09/01/18 0824          Mobility Assessment/Treatment    Extremity Weight-bearing Status left lower extremity  -RS     Left Lower Extremity (Weight-bearing Status) weight-bearing as tolerated (WBAT)  -RS     Row Name 09/01/18 0824          Bed Mobility Assessment/Treatment    Bed Mobility Assessment/Treatment supine-sit;sit-supine  -RS     Supine-Sit [place] (Bed Mobility) verbal cues;minimum assist (75% patient effort);2 person assist  -RS     Sit-Supine [place] (Bed Mobility) verbal cues;minimum assist (75% patient effort);2 person assist  -RS     Bed Mobility, Safety Issues decreased use of legs for bridging/pushing  -RS     Assistive Device (Bed Mobility) head of bed elevated;bed rails  -RS     Comment  (Bed Mobility) HOB elevated supine to sit  -[facility] Name 09/01/18 0824          Transfer Assessment/Treatment    Transfer Assessment/Treatment sit-stand transfer;stand-sit transfer  -     Sit-Stand White (Transfers) verbal cues;minimum assist (75% patient effort);moderate assist (50% patient effort);2 person assist  -RS     Stand-Sit White (Transfers) verbal cues;contact guard;minimum assist (75% patient effort);2 person assist  -[facility] Name 09/01/18 0824          Sit-Stand Transfer    Assistive Device (Sit-Stand Transfers) walker, front-wheeled  -[facility] Name 09/01/18 0824          Stand-Sit Transfer    Assistive Device (Stand-Sit Transfers) walker, front-wheeled  -[facility] Name 09/01/18 0824          Gait/Stairs Assessment/Training    White Level (Gait) not tested  -     Comment (Gait/Stairs) Patient able to stand only during the evaluation due to fatigue, nausea, and hesitation to take steps.    -RS     Row Name 09/01/18 0824          General ROM    LT Lower Ext Lt Knee Extension/Flexion  -     GENERAL ROM COMMENTS WFL in all other extremities  -[facility] Name 09/01/18 0824          Left Lower Ext    Lt Knee Extension/Flexion AROM 0-16-40  -RS     Row Name 09/01/18 0824          MMT (Manual Muscle Testing)    General MMT Comments B UE and R LE 4+/5; L LE not tested formally.  Unable to perform ASLR on left  -[facility] Name 09/01/18 0824          Motor Assessment/Intervention    Additional Documentation Balance (Group);Therapeutic Exercise (Group);Therapeutic Exercise Interventions (Group)  -RS     Row Name 09/01/18 0824          Therapeutic Exercise    50860 - PT Therapeutic Exercise Minutes 14  -RS     Row Name 09/01/18 0824          Therapeutic Exercise    Lower Extremity (Therapeutic Exercise) gastroc stretch, left;other (see comments)  -     Lower Extremity Range of Motion (Therapeutic Exercise) knee flexion/extension, left;ankle dorsiflexion/plantar flexion, left   -RS     Exercise Type (Therapeutic Exercise) static stretching;AROM (active range of motion)  -RS     Position (Therapeutic Exercise) supine   fowlers  -RS     Sets/Reps (Therapeutic Exercise) 2/20  -RS     Equipment (Therapeutic Exercise) other (see comments)   towel roll under heel with knee upright  -RS     Expected Outcome (Therapeutic Exercise) increase active range of motion;increase passive range of motion  -RS     Comment (Therapeutic Exercise) standing weight shifting using rolling walker  -RS     Row Name 09/01/18 0824          Balance    Balance static sitting balance;static standing balance  -RS     Row Name 09/01/18 0824          Static Sitting Balance    Level of [place] (Unsupported Sitting, Static Balance) supervision  -RS     Sitting Position (Unsupported Sitting, Static Balance) sitting on edge of bed  -RS     Time Able to Maintain Position (Unsupported Sitting, Static Balance) 4 to 5 minutes  -RS     Row Name 09/01/18 0824          Static Standing Balance    Level of [place] (Supported Standing, Static Balance) contact guard assist  -RS     Time Able to Maintain Position (Supported Standing, Static Balance) 1 to 2 minutes  -RS     Assistive Device Utilized (Supported Standing, Static Balance) rolling walker  -RS     Comment (Supported Standing, Static Balance) patient did not fully trust shifting weight to the left during standing  -RS     Row Name             Wound 08/31/18 1227 Left knee incision    Wound - Properties Group Date first assessed: 08/31/18  -SH Time first assessed: 1227  -SH Side: Left  -SH Location: knee  -SH Type: incision  -SH    Row Name 09/01/18 0824          Plan of Care Review    Plan of Care Reviewed With patient  -RS     Row Name 09/01/18 0824          Physical Therapy Clinical Impression    Date of Referral to PT 08/31/18  -RS     Functional Level at Time of Evaluation (PT Clinical Impression) Patient able to complete bed mobility and sit to stand transfer using  rolling walker.  Patient unable to ambulate during evaluation due to pain, weakness, and hesitation  -RS     Patient/Family Goals Statement (PT Clinical Impression) To get better  -RS     Criteria for Skilled Interventions Met (PT Clinical Impression) yes;treatment indicated  -RS     Pathology/Pathophysiology Noted (Describe Specifically for Each System) musculoskeletal  -RS     Impairments Found (describe specific impairments) gait, locomotion, and balance;ROM;muscle performance  -RS     Rehab Potential (PT Clinical Summary) good, to achieve stated therapy goals  -RS     Predicted Duration of Therapy (PT) until discharge  -RS     Care Plan Review (PT) evaluation/treatment results reviewed;care plan/treatment goals reviewed;risks/benefits reviewed  -RS     Row Name 09/01/18 0824          Vital Signs    Pre SpO2 (%) 83   nursing in room prior to evaluation and took patient off O2  -RS     O2 Delivery Pre Treatment room air  -RS     Intra SpO2 (%) 96  -RS     O2 Delivery Intra Treatment supplemental O2   3L  -RS     Post SpO2 (%) 95  -RS     O2 Delivery Post Treatment supplemental O2   3L  -RS     Pre Patient Position Supine  -RS     Intra Patient Position Standing  -RS     Post Patient Position Supine  -RS     Rest Breaks  2  -RS     Row Name 09/01/18 0824          Physical Therapy Goals    Bed Mobility Goal Selection (PT) bed mobility, PT goal 1  -RS     Transfer Goal Selection (PT) transfer, PT goal 1  -RS     Gait Training Goal Selection (PT) gait training, PT goal 1  -RS     ROM Goal Selection (PT) ROM, PT goal 1;ROM, PT goal 2  -RS     Additional Documentation ROM Goal Selection (PT) (Row)  -RS     Row Name 09/01/18 0824          Bed Mobility Goal 1 (PT)    Activity/Assistive Device (Bed Mobility Goal 1, PT) bed mobility activities, all  -RS     [place] Level/Cues Needed (Bed Mobility Goal 1, PT) conditional independence  -RS     Time Frame (Bed Mobility Goal 1, PT) by discharge  -RS     Progress/Outcomes  (Bed Mobility Goal 1, PT) goal ongoing  -RS     Row Name 09/01/18 0824          Transfer Goal 1 (PT)    Activity/Assistive Device (Transfer Goal 1, PT) sit-to-stand/stand-to-sit;bed-to-chair/chair-to-bed;walker, rolling  -RS     [place] Level/Cues Needed (Transfer Goal 1, PT) set-up required;conditional independence  -RS     Time Frame (Transfer Goal 1, PT) by discharge  -RS     Progress/Outcome (Transfer Goal 1, PT) goal ongoing  -RS     Row Name 09/01/18 0824          Gait Training Goal 1 (PT)    Activity/Assistive Device (Gait Training Goal 1, PT) gait (walking locomotion);assistive device use;improve balance and speed;increase endurance/gait distance;decrease asymmetrical patterns;decrease fall risk;diminish gait deviation  -RS     [place] Level (Gait Training Goal 1, PT) set-up required;contact guard assist;standby assist  -RS     Distance (Gait Goal 1, PT) 200  -RS     Time Frame (Gait Training Goal 1, PT) by discharge  -RS     Progress/Outcome (Gait Training Goal 1, PT) goal ongoing  -RS     Row Name 09/01/18 0824          ROM Goal 1 (PT)    ROM Goal 1 (PT) Patient will improve (L) knee extension AROM to at least -10 degrees  -RS     Time Frame (ROM Goal 1, PT) by discharge  -RS     Progress/Outcome (ROM Goal 1, PT) goal ongoing  -RS     Row Name 09/01/18 0824          ROM Goal 2 (PT)    ROM Goal 2 (PT) Patient will improve (L) knee flexion AROM to >60 degrees  -RS     Time Frame (ROM Goal 2, PT) by discharge  -RS     Progress/Outcome (ROM Goal 2, PT) goal ongoing  -RS     Row Name 09/01/18 0824          Patient Education Goal (PT)    Activity (Patient Education Goal, PT) Patient will verbalize and demonstrate discharge HEP  -RS     [place]/Cues/Accuracy (Memory Goal 2, PT) verbalizes understanding;demonstrates adequately;independent  -RS     Time Frame (Patient Education Goal, PT) by discharge  -RS     Progress/Outcome (Patient Education Goal, PT) goal ongoing  -     Row Name 09/01/18 0824           Positioning and Restraints    Pre-Treatment Position in bed  -RS     Post Treatment Position bed  -RS     In Bed fowlers;call light within reach;encouraged to call for assist;exit alarm on;side rails up x2  -RS     Row Name 09/01/18 0824          Living Environment    Home Accessibility tub/shower is not walk in  -RS       User Key  (r) = Recorded By, (t) = Taken By, (c) = Cosigned By    Initials Name Provider Type     Savana Camp, RN Registered Nurse    Arthur Del Real, PT, DPT, OCS Physical Therapist          Physical Therapy Education     Title: PT OT SLP Therapies (Active)     Topic: Physical Therapy (Active)     Point: Mobility training (Done)    Learning Progress Summary     Learner Status Readiness Method Response Comment Documented by    Patient Done Acceptance E VU,NR Educated on standing left weight shifting and importance of weight bearing with new TKA RS 09/01/18 1128          Point: Home exercise program (Done)    Learning Progress Summary     Learner Status Readiness Method Response Comment Documented by    Patient Done Acceptance E VU,Bed IU Patient educated on baseline TKA HEP packet for POD 1 RS 09/01/18 1128                      User Key     Initials Effective Dates Name Provider Type Discipline     06/19/18 -  Arthur Magana, PT, DPT, OCS Physical Therapist PT                PT Recommendation and Plan  Anticipated Discharge Disposition (PT): home with home health, home with OP services  Planned Therapy Interventions (PT Eval): balance training, gait training, patient/family education, ROM (range of motion), strengthening, stretching, transfer training  Therapy Frequency (PT Clinical Impression): 2 times/day  Outcome Summary/Treatment Plan (PT)  Anticipated Equipment Needs at Discharge (PT): front wheeled walker  Anticipated Discharge Disposition (PT): home with home health, home with OP services  Plan of Care Reviewed With: patient  Progress: no change  Outcome  Summary: PT evaluation completed.  Patient demonstrates significant loss of left knee extension and avoidance of weight bearing due to pain.  Patient was unable to take any steps today due to pain and fear avoidance.  Patient had likely chronic ROM deficits prior to admission.  Patient will benefit from skilled therapy to address the current functional deficits with anticipated continuation of therapy on home health vs. outpatient basis.  Patient does live alone and discharge destination will be pending on improvement in function during inpatient stay.          Outcome Measures     Row Name 09/01/18 0956 09/01/18 0824          How much help from another person do you currently need...    Turning from your back to your side while in flat bed without using bedrails?  -- 3  -RS     Moving from lying on back to sitting on the side of a flat bed without bedrails?  -- 3  -RS     Moving to and from a bed to a chair (including a wheelchair)?  -- 2  -RS     Standing up from a chair using your arms (e.g., wheelchair, bedside chair)?  -- 3  -RS     Climbing 3-5 steps with a railing?  -- 2  -RS     To walk in hospital room?  -- 2  -RS     AM-PAC 6 Clicks Score  -- 15  -RS        How much help from another is currently needed...    Putting on and taking off regular lower body clothing? 2  -CH  --     Bathing (including washing, rinsing, and drying) 2  -CH  --     Toileting (which includes using toilet bed pan or urinal) 2  -CH  --     Putting on and taking off regular upper body clothing 4  -CH  --     Taking care of personal grooming (such as brushing teeth) 4  -CH  --     Eating meals 4  -CH  --     Score 18  -CH  --        Functional Assessment    Outcome Measure Options AM-PAC 6 Clicks Daily Activity (OT)  -CH AM-PAC 6 Clicks Basic Mobility (PT)  -RS       User Key  (r) = Recorded By, (t) = Taken By, (c) = Cosigned By    Initials Name Provider Type    CH Demetra Blackwood, OTR/L Occupational Therapist    Arthur Del Real  Mike, PT, DPT, OCS Physical Therapist           Time Calculation:         PT Charges     Row Name 09/01/18 0824             Time Calculation    Start Time 0925  -RS      Stop Time 1025  -RS      Time Calculation (min) 60 min  -RS      PT Received On 09/01/18  -RS      PT Goal Re-Cert Due Date 10/01/18  -RS         Time Calculation- PT    Total Timed Code Minutes- PT 14 minute(s)  -RS         Timed Charges    22953 - PT Therapeutic Exercise Minutes 14  -RS        User Key  (r) = Recorded By, (t) = Taken By, (c) = Cosigned By    Initials Name Provider Type    RS Arthur Magana, PT, DPT, OCS Physical Therapist        Therapy Suggested Charges     Code   Minutes Charges    15474 (CPT®) Hc Pt Neuromusc Re Education Ea 15 Min      73642 (CPT®) Hc Pt Ther Proc Ea 15 Min 14 1    27937 (CPT®) Hc Gait Training Ea 15 Min      05547 (CPT®) Hc Pt Therapeutic Act Ea 15 Min      53557 (CPT®) Hc Pt Manual Therapy Ea 15 Min      73314 (CPT®) Hc Pt Iontophoresis Ea 15 Min      63258 (CPT®) Hc Pt Elec Stim Ea-Per 15 Min      06254 (CPT®) Hc Pt Ultrasound Ea 15 Min      05564 (CPT®) Hc Pt Self Care/Mgmt/Train Ea 15 Min      36766 (CPT®) Hc Pt Prosthetic (S) Train Initial Encounter, Each 15 Min      32978 (CPT®) Hc Pt Orthotic(S)/Prosthetic(S) Encounter, Each 15 Min      49032 (CPT®) Hc Orthotic(S) Mgmt/Train Initial Encounter, Each 15min      Total  14 1        Therapy Charges for Today     Code Description Service Date Service Provider Modifiers Qty    94811923900 HC PT MOBILITY CURRENT 9/1/2018 Arthur Magana, PT, DPT, OCS GP, CK 1    75971876464 HC PT MOBILITY PROJECTED 9/1/2018 Arthur Magana, PT, DPT, OCS GP, CI 1    37259109565 HC PT EVAL LOW COMPLEXITY 3 9/1/2018 Arthur Magana, PT, DPT, OCS GP, KX 1    78048181558 HC PT THER PROC EA 15 MIN 9/1/2018 Arthur Magana, PT, DPT, OCS GP, KX 1          PT G-Codes  Outcome Measure Options: AM-PAC 6 Clicks Daily Activity (OT)  Score:  15  Functional Limitation: Mobility: Walking and moving around  Mobility: Walking and Moving Around Current Status (): At least 40 percent but less than 60 percent impaired, limited or restricted  Mobility: Walking and Moving Around Goal Status (): At least 1 percent but less than 20 percent impaired, limited or restricted      RRufino Magana, PT, DPT, OCS  9/1/2018

## 2018-09-01 NOTE — PLAN OF CARE
Problem: Patient Care Overview  Goal: Plan of Care Review  Outcome: Ongoing (interventions implemented as appropriate)   09/01/18 0933   Coping/Psychosocial   Plan of Care Reviewed With patient   Plan of Care Review   Progress improving   OTHER   Outcome Summary OT eval completed. Pt. reports high levels of pain & demo'd increased nausea during eval. She required Min A x 1-2 for bed mobility, Min A x 2 for t/fs, and was unable to take steps at ths time. I anticipate she will require Max A for LBD and higher level ADLs. THe pt. would benefit from cont'd OT tx to improve her indpe in self care & fxl mobility. Ms. Webster has a tub/shower combo and would benefit from instruction in using at [facility]TB. Anticipate DC home.

## 2018-09-01 NOTE — PROGRESS NOTES
"  Orthopedic Surgery Left TKRA Progress Note        Mayda Webster  2018  POD# 1 Day Post-Op    Subjective:     Interval: Dressing changed and drain pulled this a.m. without issue    Objective:     General: A&O x3, NAD, No signs or symptoms of PE.   Wound: clean, dry, intact             Dressing: Clean, Dry, Intact   Extremity: Distal NVI, Soft throughout           DVT Exam: Negative Shanelle's sign.                   Data Review:  Vitals:  /69 (BP Location: Left arm, Patient Position: Lying)   Pulse 85   Temp 98.2 °F (36.8 °C) (Oral)   Resp 13   Ht 157.5 cm (62\")   Wt 121 kg (266 lb)   LMP  (LMP Unknown)   SpO2 96%   BMI 48.65 kg/m²   Temp (24hrs), Av °F (36.7 °C), Min:97 °F (36.1 °C), Max:98.8 °F (37.1 °C)      I/O (24Hr):    Intake/Output Summary (Last 24 hours) at 18 1007  Last data filed at 18 0947   Gross per 24 hour   Intake             1740 ml   Output             1300 ml   Net              440 ml       Labs:  Lab Results (last 72 hours)     Procedure Component Value Units Date/Time    Hemoglobin & Hematocrit, Blood [[id number]]  (Abnormal) Collected:  18    Specimen:  Blood Updated:  18 05     Hemoglobin 11.5 (L) g/dL      Hematocrit 36.3 (L) %           Assessment:     <principal problem not specified>  POD 1 Day Post-Op TOTAL KNEE ARTHROPLASTY (Left)    Plan:      1:  DVT prophylaxis, ICE, elevate  2:  Pain control  3:  Physical therapy/Occupation therapy  4:  Anticipate discharge tomorrow if pain well controlled  5:  WBAT operative extremity    Electronically signed by Faizan Brock MD on 2018 at 10:07 AM      "

## 2018-09-01 NOTE — THERAPY EVALUATION
Acute Care - Occupational Therapy Initial Evaluation  [facility]     Patient Name: Mayda Webster  : 1970  MRN: [id number]  Today's Date: 2018  Onset of Illness/Injury or Date of Surgery: 18  Date of Referral to OT: 18  Referring Physician: Dr. White    Admit Date: 2018       ICD-10-CM ICD-9-CM   1. Osteoarthritis of left knee M17.12 715.96   2. Aftercare following left knee joint replacement surgery Z47.1 V54.81    Z96.652 V43.65     Patient Active Problem List   Diagnosis   • Primary osteoarthritis of left knee     Past Medical History:   Diagnosis Date   • Arthritis    • Joint pain    • Lower back pain      Past Surgical History:   Procedure Laterality Date   • CYST REMOVAL Right     Right upper leg   • HYSTERECTOMY            OT ASSESSMENT FLOWSHEET (last 72 hours)      Occupational Therapy Evaluation     Row Name 18 0823                   OT Evaluation Time/Intention    Subjective Information complains of;pain  -CH        Document Type evaluation  -        Mode of Treatment occupational therapy  -        Symptoms Noted During/After Treatment increased pain   nausea  -        Comment 0956  -           General Information    Patient Profile Reviewed? yes  -CH        Onset of Illness/Injury or Date of Surgery 18  -        Referring Physician Dr. White  -        Patient Observations alert;cooperative;agree to therapy  -        General Observations of Patient fowlers, O2 per NC, IVs with PCA  -        Prior Level of Function independent:;all household mobility;min assist:;ADL's   unable to SEJAL socks  -        Equipment Currently Used at Home cane, straight;walker, rolling;shower chair  -        Pertinent History of Current Functional Problem L knee pain s/p Left total knee Arthroplasty  -        Existing Precautions/Restrictions fall  -        Equipment Issued to Patient walker, front wheeled  -        Risks Reviewed patient:;LOB;increased  discomfort;nausea/vomiting;dizziness  -        Benefits Reviewed patient:;improve function;increase independence;increase balance;increase strength  -           Relationship/Environment    Primary Source of Support/Comfort child(werner)  -        Lives With alone  -        Family Caregiver if Needed child(werner), adult  -           Resource/Environmental Concerns    Current Living Arrangements home/apartment/condo  -           Cognitive Assessment/Interventions    Additional Documentation Cognitive Assessment/Intervention (Group)  -           Cognitive Assessment/Intervention- PT/OT    Affect/Mental Status (Cognitive) WNL  -        Follows Commands (Cognition) WNL  -        Cognitive Function (Cognitive) WNL  -        Personal Safety Interventions fall prevention program maintained;gait belt;muscle strengthening facilitated;nonskid shoes/slippers when out of bed  -           Safety Issues, Functional Mobility    Impairments Affecting Function (Mobility) pain;balance;endurance/activity tolerance  -           Bed Mobility Assessment/Treatment    Bed Mobility Assessment/Treatment supine-sit  -        Supine-Sit [place] (Bed Mobility) minimum assist (75% patient effort);verbal cues  -        Sit-Supine [place] (Bed Mobility) minimum assist (75% patient effort);2 person assist;verbal cues  -        Bed Mobility, Safety Issues decreased use of legs for bridging/pushing  -        Assistive Device (Bed Mobility) bed rails;head of bed elevated  -           Functional Mobility    Functional Mobility- Comment not tested at this time 2' pain & nausea  -           Transfer Assessment/Treatment    Transfer Assessment/Treatment sit-stand transfer;stand-sit transfer  -           Sit-Stand Transfer    Sit-Stand [place] (Transfers) minimum assist (75% patient effort);2 person assist;verbal cues;nonverbal cues (demo/gesture)  -        Assistive Device (Sit-Stand Transfers) walker,  front-wheeled  -           Stand-Sit Transfer    Stand-Sit [place] (Transfers) contact guard;minimum assist (75% patient effort);2 person assist;verbal cues;nonverbal cues (demo/gesture)  -        Assistive Device (Stand-Sit Transfers) walker, front-wheeled  -           ADL Assessment/Intervention    BADL Assessment/Intervention lower body dressing  -           Lower Body Dressing Assessment/Training    Lower Body Dressing [place] Level maximum assist (25% patient effort);don;doff;socks  -        Lower Body Dressing Position edge of bed sitting  -           BADL Safety/Performance    Skilled BADL Treatment/Intervention BADL process/adaptation training;adaptive equipment training  -           General ROM    GENERAL ROM COMMENTS BUE AROM WFL  -           MMT (Manual Muscle Testing)    General MMT Comments BUEs 4+/5  -           Motor Assessment/Interventions    Additional Documentation Balance (Group)  -           Balance    Balance static sitting balance;static standing balance  -           Static Sitting Balance    Level of [place] (Unsupported Sitting, Static Balance) supervision  -        Sitting Position (Unsupported Sitting, Static Balance) sitting on edge of bed  -           Static Standing Balance    Level of [place] (Supported Standing, Static Balance) contact guard assist  -        Assistive Device Utilized (Supported Standing, Static Balance) rolling walker  -           Positioning and Restraints    Pre-Treatment Position in bed  -        Post Treatment Position bed  -        In Bed fowlers;call light within reach;encouraged to call for assist;side rails up x2;exit alarm on  -           Pain Assessment    Additional Documentation Pain Scale: Numbers Pre/Post-Treatment (Group)  -           Pain Scale: Numbers Pre/Post-Treatment    Pain Scale: Numbers, Post-Treatment 8/10  -        Pain Location - Side Left  -        Pain Location knee  -            Wound 08/31/18 1227 Left knee incision    Wound - Properties Group Date first assessed: 08/31/18  -SH Time first assessed: 1227  -SH Side: Left  -SH Location: knee  -SH Type: incision  -SH       Coping    Observed Emotional State calm;cooperative  -           Plan of Care Review    Plan of Care Reviewed With patient  -           Clinical Impression (OT)    Date of Referral to OT 09/01/18  -        OT Diagnosis decline in ADLs  -        Patient/Family Goals Statement (OT Eval) return home, reduce pain  -        Criteria for Skilled Therapeutic Interventions Met (OT Eval) yes;treatment indicated  -        Rehab Potential (OT Eval) good, to achieve stated therapy goals  -        Therapy Frequency (OT Eval) 5 times/wk  -        Predicted Duration of Therapy Intervention (Therapy Eval) until DC from this facility  -        Care Plan Review (OT) evaluation/treatment results reviewed;care plan/treatment goals reviewed;risks/benefits reviewed;current/potential barriers reviewed;patient/other agree to care plan  -        Anticipated Equipment Needs at Discharge (OT) tub bench;front wheeled walker  -        Anticipated Discharge Disposition (OT) home with assist  -           Planned OT Interventions    Planned Therapy Interventions (OT Eval) adaptive equipment training;BADL retraining;functional balance retraining;occupation/activity based interventions;patient/caregiver education/training;strengthening exercise;transfer/mobility retraining  -           OT Goals    Transfer Goal Selection (OT) transfer, OT goal 1;transfer, OT goal 2  -        Bathing Goal Selection (OT) --  -        Dressing Goal Selection (OT) dressing, OT goal 1  -        Toileting Goal Selection (OT) toileting, OT goal 1  -           Transfer Goal 1 (OT)    Activity/Assistive Device (Transfer Goal 1, OT) sit-to-stand/stand-to-sit;bed-to-chair/chair-to-bed;commode, 3-in-1  -        [place] Level/Cues Needed  (Transfer Goal 1, OT) conditional independence  -CH        Time Frame (Transfer Goal 1, OT) long term goal (LTG);by discharge  -CH        Barriers (Transfers Goal 1, OT) pain  -CH        Progress/Outcome (Transfer Goal 1, OT) goal ongoing  -CH           Transfer Goal 2 (OT)    Activity/Assistive Device (Transfer Goal 2, OT) tub;tub bench  -CH        [place] Level/Cues Needed (Transfer Goal 2, OT) supervision required;verbal cues required  -CH        Time Frame (Transfer Goal 2, OT) long term goal (LTG);by discharge  -CH        Barriers (Transfers Goal 2, OT) pain  -CH        Progress/Outcome (Transfer Goal 2, OT) goal ongoing  -CH           Bathing Goal 1 (OT)    Activity/Assistive Device (Bathing Goal 1, OT) bathing skills, all  -CH        [place] Level/Cues Needed (Bathing Goal 1, OT) minimum assist (75% or more patient effort);verbal cues required  -CH        Time Frame (Bathing Goal 1, OT) long term goal (LTG);by discharge  -CH        Barriers (Bathing Goal 1, OT) .  -CH        Progress/Outcomes (Bathing Goal 1, OT) goal ongoing  -CH           Dressing Goal 1 (OT)    Activity/Assistive Device (Dressing Goal 1, OT) lower body dressing;reacher;sock-aid  -CH        [place]/Cues Needed (Dressing Goal 1, OT) supervision required;verbal cues required  -CH        Time Frame (Dressing Goal 1, OT) long term goal (LTG);by discharge  -CH        Barriers (Dressing Goal 1, OT) .  -CH        Progress/Outcome (Dressing Goal 1, OT) goal ongoing  -CH           Toileting Goal 1 (OT)    Activity/Device (Toileting Goal 1, OT) toileting skills, all;commode, 3-in-1  -CH        [place] Level/Cues Needed (Toileting Goal 1, OT) supervision required  -CH        Time Frame (Toileting Goal 1, OT) long term goal (LTG);by discharge  -CH        Barriers (Toileting Goal 1, OT) .  -CH        Progress/Outcome (Toileting Goal 1, OT) goal ongoing  -CH           Living Environment    Home Accessibility tub/shower is not walk in   -          User Key  (r) = Recorded By, (t) = Taken By, (c) = Cosigned By    Initials Name Effective Dates     Demetra Blackwood, OTR/PRIYA 08/02/16 -     Savana Angela RN 08/02/16 -            Occupational Therapy Education     Title: PT OT SLP Therapies (Active)     Topic: Occupational Therapy (Active)     Point: ADL training (Done)     Description: Instruct learner(s) on proper safety adaptation and remediation techniques during self care or transfers.   Instruct in proper use of assistive devices.   Learning Progress Summary     Learner Status Readiness Method Response Comment Documented by    Patient Done Acceptance E,HERNANDO SMALLWOOD,NR   09/01/18 1126                      User Key     Initials Effective Dates Name Provider Type Discipline     08/02/16 -  Demetra Blackwood, JORGE/L Occupational Therapist OT                  OT Recommendation and Plan  Outcome Summary/Treatment Plan (OT)  Anticipated Equipment Needs at Discharge (OT): tub bench, front wheeled walker  Anticipated Discharge Disposition (OT): home with assist  Planned Therapy Interventions (OT Eval): adaptive equipment training, BADL retraining, functional balance retraining, occupation/activity based interventions, patient/caregiver education/training, strengthening exercise, transfer/mobility retraining  Therapy Frequency (OT Eval): 5 times/wk  Plan of Care Review  Plan of Care Reviewed With: patient  Plan of Care Reviewed With: patient  Outcome Summary: OT eval completed.  Pt. reports high levels of pain & demo'd increased nausea during eval.  She required Min A x 1-2 for bed mobility, Min A x 2 for t/fs, and was unable to take steps at ths time.  I anticipate she will require Max A for LBD and higher level ADLs. THe pt. would benefit from cont'd OT tx to improve her indpe in self care & fxl mobility. Ms. Webster has a tub/shower combo and would benefit from instruction in using at [facility].  Anticipate DC home.          Outcome Measures     Row Name 09/01/18  0956 09/01/18 0824          How much help from another person do you currently need...    Turning from your back to your side while in flat bed without using bedrails?  -- 3  -RS     Moving from lying on back to sitting on the side of a flat bed without bedrails?  -- 3  -RS     Moving to and from a bed to a chair (including a wheelchair)?  -- 2  -RS     Standing up from a chair using your arms (e.g., wheelchair, bedside chair)?  -- 3  -RS     Climbing 3-5 steps with a railing?  -- 2  -RS     To walk in hospital room?  -- 2  -RS     AM-PAC 6 Clicks Score  -- 15  -RS        How much help from another is currently needed...    Putting on and taking off regular lower body clothing? 2  -CH  --     Bathing (including washing, rinsing, and drying) 2  -CH  --     Toileting (which includes using toilet bed pan or urinal) 2  -CH  --     Putting on and taking off regular upper body clothing 4  -CH  --     Taking care of personal grooming (such as brushing teeth) 4  -CH  --     Eating meals 4  -CH  --     Score 18  -CH  --        Functional Assessment    Outcome Measure Options AM-PAC 6 Clicks Daily Activity (OT)  -CH AM-PAC 6 Clicks Basic Mobility (PT)  -       User Key  (r) = Recorded By, (t) = Taken By, (c) = Cosigned By    Initials Name Provider Type    Demetra Santos, OTR/L Occupational Therapist    RS Arthur Magana, PT, DPT, OCS Physical Therapist          Time Calculation:         Time Calculation- OT     Row Name 09/01/18 0956             Time Calculation- OT    OT Start Time 0956  -      OT Stop Time 1045  -      OT Time Calculation (min) 49 min  -      OT Received On 09/01/18  -      OT Goal Re-Cert Due Date 09/11/18  -        User Key  (r) = Recorded By, (t) = Taken By, (c) = Cosigned By    Initials Name Provider Type    Demetra Santos, OTR/L Occupational Therapist        Therapy Suggested Charges     Code   Minutes Charges    None           Therapy Charges for Today     Code Description  Service Date Service Provider Modifiers Qty    05971650935 HC OT SELFCARE CURRENT 9/1/2018 Demetra Blackwood OTR/L SUDHIR, CK 1    82129164287 HC OT SELFCARE PROJECTED 9/1/2018 Demetra Blackwood OTR/L SUDHIR, CJ 1    [id number]  OT EVAL MOD COMPLEXITY 3 9/1/2018 Demetra Blackwood OTR/L SUDHIR, KX 1          OT G-codes  OT Professional Judgement Used?: Yes  OT Functional Scales Options: AM-PAC 6 Clicks Daily Activity (OT)  Score: 18  Functional Limitation: Self care  Self Care Current Status (): At least 40 percent but less than 60 percent impaired, limited or restricted  Self Care Goal Status (): At least 20 percent but less than 40 percent impaired, limited or restricted    Demetra Blackwood OTR/PRIYA  9/1/2018

## 2018-09-01 NOTE — PLAN OF CARE
Problem: Patient Care Overview  Goal: Plan of Care Review  Outcome: Ongoing (interventions implemented as appropriate)   09/01/18 1121   Coping/Psychosocial   Plan of Care Reviewed With patient   Plan of Care Review   Progress no change   OTHER   Outcome Summary PT evaluation completed. Patient demonstrates significant loss of left knee extension and avoidance of weight bearing due to pain. Patient was unable to take any steps today due to pain and fear avoidance. Patient had likely chronic ROM deficits prior to admission. Patient will benefit from skilled therapy to address the current functional deficits with anticipated continuation of therapy on home health vs. outpatient basis. Patient does live alone and discharge destination will be pending on improvement in function during inpatient stay.

## 2018-09-02 VITALS
HEART RATE: 73 BPM | WEIGHT: 266 LBS | OXYGEN SATURATION: 99 % | BODY MASS INDEX: 48.95 KG/M2 | DIASTOLIC BLOOD PRESSURE: 68 MMHG | SYSTOLIC BLOOD PRESSURE: 101 MMHG | RESPIRATION RATE: 16 BRPM | HEIGHT: 62 IN | TEMPERATURE: 98.1 F

## 2018-09-02 PROCEDURE — 63710000001 PREGABALIN 50 MG CAPSULE: Performed by: ORTHOPAEDIC SURGERY

## 2018-09-02 PROCEDURE — A9270 NON-COVERED ITEM OR SERVICE: HCPCS | Performed by: ORTHOPAEDIC SURGERY

## 2018-09-02 PROCEDURE — 63710000001 HYDROCODONE-ACETAMINOPHEN 10-325 MG TABLET: Performed by: ORTHOPAEDIC SURGERY

## 2018-09-02 PROCEDURE — 97116 GAIT TRAINING THERAPY: CPT

## 2018-09-02 PROCEDURE — 63710000001 ASPIRIN 325 MG TABLET: Performed by: ORTHOPAEDIC SURGERY

## 2018-09-02 PROCEDURE — 25010000003 MORPHINE PER 100 MG: Performed by: ORTHOPAEDIC SURGERY

## 2018-09-02 PROCEDURE — 97110 THERAPEUTIC EXERCISES: CPT

## 2018-09-02 PROCEDURE — 63710000001 MAGNESIUM HYDROXIDE 2400 MG/10ML SUSPENSION: Performed by: ORTHOPAEDIC SURGERY

## 2018-09-02 RX ORDER — HYDROCODONE BITARTRATE AND ACETAMINOPHEN 7.5; 325 MG/1; MG/1
1 TABLET ORAL EVERY 6 HOURS PRN
Qty: 40 TABLET | Refills: 0 | Status: SHIPPED | OUTPATIENT
Start: 2018-09-02

## 2018-09-02 RX ORDER — ASPIRIN 325 MG
325 TABLET ORAL DAILY
Qty: 21 TABLET | Refills: 0
Start: 2018-09-03

## 2018-09-02 RX ADMIN — HYDROCODONE BITARTRATE AND ACETAMINOPHEN 1 TABLET: 10; 325 TABLET ORAL at 08:01

## 2018-09-02 RX ADMIN — MILK OF MAGNESIA 10 ML: 2400 CONCENTRATE ORAL at 04:37

## 2018-09-02 RX ADMIN — ASPIRIN 325 MG: 325 TABLET ORAL at 08:01

## 2018-09-02 RX ADMIN — HYDROCODONE BITARTRATE AND ACETAMINOPHEN 1 TABLET: 10; 325 TABLET ORAL at 04:37

## 2018-09-02 RX ADMIN — HYDROCODONE BITARTRATE AND ACETAMINOPHEN 1 TABLET: 10; 325 TABLET ORAL at 00:25

## 2018-09-02 RX ADMIN — MORPHINE SULFATE: 25 INJECTION, SOLUTION, CONCENTRATE INTRAVENOUS at 03:05

## 2018-09-02 RX ADMIN — PREGABALIN 50 MG: 50 CAPSULE ORAL at 08:01

## 2018-09-02 RX ADMIN — HYDROCODONE BITARTRATE AND ACETAMINOPHEN 1 TABLET: 10; 325 TABLET ORAL at 11:42

## 2018-09-02 NOTE — PLAN OF CARE
Problem: Patient Care Overview  Goal: Plan of Care Review  Outcome: Ongoing (interventions implemented as appropriate)   09/02/18 0863   Coping/Psychosocial   Plan of Care Reviewed With patient   Plan of Care Review   Progress improving   OTHER   Outcome Summary Pt reports that she has a rough night last night with difficulty sleeping. Pt independent with head of bed elevated for supine to sit and CGA-SBA for all other mobility. She did require increased cueing for stand to sit transfer to avoid excessive knee flexion. She also required min assist for toilet transfer. Pt ambulated with decreased weightbearing on LLE, decreased step length on RLE, and decreased knee flexion, heel strike and toe of on LLE. Pt able to tolerated 7-10 reps with TKR protocol and measures 55 degrees knee flexion and lacking 7 degrees knee extension today. Pt will need RW at D/C and possibly BSC.

## 2018-09-02 NOTE — DISCHARGE INSTR - ACTIVITY
Full weight bearing.  Use walker when out of bed.  Daily dry gauze dressing change and as needed.  You may shower.

## 2018-09-02 NOTE — THERAPY TREATMENT NOTE
Acute Care - Physical Therapy Treatment Note  [facility]     Patient Name: Mayda Webster  : 1970  MRN: [id number]  Today's Date: 2018  Onset of Illness/Injury or Date of Surgery: 18  Date of Referral to PT: 18  Referring Physician: Dr. White    Admit Date: 2018    Visit Dx:    ICD-10-CM ICD-9-CM   1. Osteoarthritis of left knee M17.12 715.96   2. Aftercare following left knee joint replacement surgery Z47.1 V54.81    Z96.652 V43.65     Patient Active Problem List   Diagnosis   • Primary osteoarthritis of left knee       Therapy Treatment          Rehabilitation Treatment Summary     Row Name 18 0835             Treatment Time/Intention    Discipline physical therapy assistant  -TR      Document Type therapy note (daily note)  -TR      Subjective Information complains of;pain  -TR      Mode of Treatment physical therapy  -TR      Patient/Family Observations no family in room  -TR      Patient Effort adequate  -TR      Comment TKR   -TR      Recorded by [TR] Sanjuanita Reddy, ROBERT 18 0957      Row Name 18 0835             Cognitive Assessment/Intervention- PT/OT    Personal Safety Interventions elopement precautions initiated;fall prevention program maintained;gait belt;nonskid shoes/slippers when out of bed  -TR      Recorded by [TR] Sanjuanita Reddy, ROBERT 18 0957      Row Name 18 0835             Bed Mobility Assessment/Treatment    Supine-Sit [place] (Bed Mobility) conditional independence  -TR      Bed Mobility, Safety Issues decreased use of legs for bridging/pushing  -TR      Assistive Device (Bed Mobility) head of bed elevated;bed rails  -TR      Recorded by [TR] Sanjuanita Reddy PTA 18 0957      Row Name 18 0835             Transfer Assessment/Treatment    Transfer Assessment/Treatment stand-sit transfer;sit-stand transfer;toilet transfer  -TR      Maintains Weight-bearing Status (Transfers) able to maintain  -TR       Comment (Transfers) cues to scoot LLE out before sitting down   -TR      Recorded by [TR] Sanjuanita Reddy, PTA 09/02/18 0957      Row Name 09/02/18 0835             Sit-Stand Transfer    Sit-Stand [place] (Transfers) verbal cues;contact guard  -TR      Assistive Device (Sit-Stand Transfers) walker, front-wheeled  -TR      Recorded by [TR] Sanjuanita Reddy, PTA 09/02/18 0957      Row Name 09/02/18 0835             Stand-Sit Transfer    Stand-Sit [place] (Transfers) verbal cues;contact guard  -TR      Assistive Device (Stand-Sit Transfers) walker, front-wheeled  -TR      Recorded by [TR] Sanjuanita Reddy, PTA 09/02/18 0957      Row Name 09/02/18 0835             Toilet Transfer    Type (Toilet Transfer) sit-stand;stand-sit  -TR      [place] Level (Toilet Transfer) verbal cues;contact guard;minimum assist (75% patient effort)  -TR      Assistive Device (Toilet Transfer) walker, front-wheeled  -TR      Recorded by [TR] Sanjuanita Reddy, \[facility] [place] [facility]\"" 09/02/18 0957      Row Name 09/02/18 0835             Gait/Stairs Assessment/Training    Gait/Stairs Assessment/Training gait/ambulation assistive device;gait/ambulation independence;distance ambulated;gait pattern;gait deviations;maintains weight-bearing status  -TR      [place] Level (Gait) verbal cues;contact guard;stand by assist  -TR      Assistive Device (Gait) walker, front-wheeled  -TR      Distance in Feet (Gait) 40'  -TR      Pattern (Gait) step-to  -TR      Deviations/Abnormal Patterns (Gait) stride length decreased;karis decreased;base of support, wide  -TR      Left Sided Gait Deviations heel strike decreased;forward flexed posture  -TR      Recorded by [TR] Sanjuanita Reddy, PTA 09/02/18 0957      Row Name 09/02/18 0835             General ROM    GENERAL ROM COMMENTS LLE (TKE Protocol) exercise including AROM LAQ's in chair x7, quad sets, AP's, SAQ x10 reclined in chair, and AAROM heel slides x10.   -TR      Recorded by [TR]  Sanjuanita Reddy, PTA 09/02/18 0957      Row Name 09/02/18 0835             Left Lower Ext    Lt Knee Extension/Flexion AROM Extension 7 degrees from neutral, flexion 55 degrees.   -TR      Recorded by [TR] Sanjuanita Reddy, PTA 09/02/18 0957      Row Name 09/02/18 0835             Therapeutic Exercise    Lower Extremity (Therapeutic Exercise) gastroc stretch, left;heel slides, left;quad sets, left;LAQ (long arc quad), left;SAQ (short arc quad), left  -TR      Lower Extremity Range of Motion (Therapeutic Exercise) knee flexion/extension, left;ankle dorsiflexion/plantar flexion, left  -TR      Exercise Type (Therapeutic Exercise) AROM (active range of motion);AAROM (active assistive range of motion);static stretching  -TR      Position (Therapeutic Exercise) --   reclined  -TR      Sets/Reps (Therapeutic Exercise) 1/10  -TR      Expected Outcome (Therapeutic Exercise) increase active range of motion  -TR      Comment (Therapeutic Exercise) standing weight shifting using ru walker   -TR      Recorded by [TR] Sanjuanita Reddy, PTA 09/02/18 0957      Row Name 09/02/18 0835             Static Sitting Balance    Level of [place] (Unsupported Sitting, Static Balance) supervision  -TR      Sitting Position (Unsupported Sitting, Static Balance) sitting on edge of bed  -TR      Recorded by [TR] Sanjuanita Reddy, PTA 09/02/18 0957      Row Name 09/02/18 0835             Static Standing Balance    Level of [place] (Supported Standing, Static Balance) contact guard assist  -TR      Time Able to Maintain Position (Supported Standing, Static Balance) 1 to 2 minutes  -TR      Assistive Device Utilized (Supported Standing, Static Balance) rolling walker  -TR      Recorded by [TR] Sanjuanita Reddy, PTA 09/02/18 0957      Row Name 09/02/18 0835             Positioning and Restraints    Pre-Treatment Position in bed  -TR      Post Treatment Position chair  -TR      In Chair reclined;call light within  reach;encouraged to call for assist;notified nsg  -TR      Recorded by [TR] Sanjuanita Reddy, ROBERT 09/02/18 0957      Row Name 09/02/18 0835             Pain Scale: Numbers Pre/Post-Treatment    Pain Scale: Numbers, Post-Treatment 3/10  -TR      Pain Location - Side Left  -TR      Pain Location knee  -TR      Recorded by [TR] Sanjuanita Reddy PTA 09/02/18 0957      Row Name                Wound 08/31/18 1227 Left knee incision    Wound - Properties Group Date first assessed: 08/31/18 [SH] Time first assessed: 1227 [SH] Side: Left [SH] Location: knee [SH] Type: incision [SH] Recorded by:  [SH] Savana Camp RN 08/31/18 1227      User Key  (r) = Recorded By, (t) = Taken By, (c) = Cosigned By    Initials Name Effective Dates Discipline    SH Savana Camp RN 08/02/16 -  Nurse    TR Sanjuanita Reddy PTA 08/02/16 -  PT          Wound 08/31/18 1227 Left knee incision (Active)   Dressing Appearance dry;intact 9/2/2018  8:00 AM   Base dressing in place, unable to visualize 9/2/2018  8:00 AM   Drainage Amount none 9/2/2018  8:00 AM   Dressing Care, Wound elastic bandage 9/2/2018  8:00 AM             Physical Therapy Education     Title: PT OT SLP Therapies (Active)     Topic: Physical Therapy (Active)     Point: Mobility training (Done)    Learning Progress Summary     Learner Status Readiness Method Response Comment Documented by    Patient Done Acceptance E SELIN,NR Educated on standing left weight shifting and importance of weight bearing with new TKA RS 09/01/18 1128          Point: Home exercise program (Done)    Learning Progress Summary     Learner Status Readiness Method Response Comment Documented by    Patient Done Acceptance E,D,TB VU,DU Pt educated on HEP packet POD 2, Educated on gait training and satey TR 09/02/18 0957     Done Acceptance DIONI SMALLWOOD,Bed IU Patient educated on baseline TKA HEP packet for POD 1 RS 09/01/18 1128                      User Key     Initials Effective Dates Name Provider Type  Discipline    TR 08/02/16 -  Sanjuanita Reddy, PTA Physical Therapy Assistant PT    RS 06/19/18 -  Arthur Magana, PT, DPT, OCS Physical Therapist PT                    PT Recommendation and Plan     Plan of Care Reviewed With: patient  Progress: improving  Outcome Summary: Pt reports that she has a rough night last night with difficulty sleeping. Pt independent with head of bed elevated for supine to sit and CGA-SBA for all other mobility. She did require increased cueing for stand to sit transfer to avoid excessive knee flexion. She also requied min assist for toliet transfer. Pt ambualted with decreased weightbeaaring on LLE, decreased step length on RLE,  and decreased knee flexion, heel strike and toe of on LLE.  Pt able to tolerated 7-10 reps with TKR protocol and measures 55 segrees knee flexion and lacking 7 degrees knee extension today. Pt will need RW at D/C and possibly BSC.           Outcome Measures     Row Name 09/02/18 0835 09/01/18 0956 09/01/18 0824       How much help from another person do you currently need...    Turning from your back to your side while in flat bed without using bedrails? 4  -TR  -- 3  -RS    Moving from lying on back to sitting on the side of a flat bed without bedrails? 4  -TR  -- 3  -RS    Moving to and from a bed to a chair (including a wheelchair)? 3  -TR  -- 2  -RS    Standing up from a chair using your arms (e.g., wheelchair, bedside chair)? 3  -TR  -- 3  -RS    Climbing 3-5 steps with a railing? 2  -TR  -- 2  -RS    To walk in hospital room? 3  -TR  -- 2  -RS    AM-PAC 6 Clicks Score 19  -TR  -- 15  -RS       How much help from another is currently needed...    Putting on and taking off regular lower body clothing?  -- 2  -CH  --    Bathing (including washing, rinsing, and drying)  -- 2  -CH  --    Toileting (which includes using toilet bed pan or urinal)  -- 2  -CH  --    Putting on and taking off regular upper body clothing  -- 4  -CH  --    Taking care of  personal grooming (such as brushing teeth)  -- 4  -CH  --    Eating meals  -- 4  -CH  --    Score  -- 18  -CH  --       Functional Assessment    Outcome Measure Options AM-PAC 6 Clicks Basic Mobility (PT)  -TR AM-PAC 6 Clicks Daily Activity (OT)  -CH AM-PAC 6 Clicks Basic Mobility (PT)  -RS      User Key  (r) = Recorded By, (t) = Taken By, (c) = Cosigned By    Initials Name Provider Type     Demetra Blackwood, OTR/L Occupational Therapist    Sanjuanita Snyder, ROBERT Physical Therapy Assistant    RS Arthur Magana, PT, DPT, OCS Physical Therapist           Time Calculation:         PT Charges     Row Name 09/02/18 0835             Time Calculation    Start Time 0835  -TR      Stop Time 0928  -TR      Time Calculation (min) 53 min  -TR      PT Received On 09/02/18  -TR      PT Goal Re-Cert Due Date 10/11/18  -TR         Time Calculation- PT    Total Timed Code Minutes- PT 53 minute(s)  -TR         Timed Charges    27246 - PT Therapeutic Exercise Minutes 30  -TR      26201 - Gait Training Minutes  23  -TR        User Key  (r) = Recorded By, (t) = Taken By, (c) = Cosigned By    Initials Name Provider Type    Sanjuanita Snyder, PTA Physical Therapy Assistant        Therapy Suggested Charges     Code   Minutes Charges    26987 (CPT®) Hc Pt Neuromusc Re Education Ea 15 Min      31875 (CPT®) Hc Pt Ther Proc Ea 15 Min 30 2    63983 (CPT®) Hc Gait Training Ea 15 Min 23 2    11527 (CPT®) Hc Pt Therapeutic Act Ea 15 Min      20904 (CPT®) Hc Pt Manual Therapy Ea 15 Min      86576 (CPT®) Hc Pt Iontophoresis Ea 15 Min      55852 (CPT®) Hc Pt Elec Stim Ea-Per 15 Min      88520 (CPT®) Hc Pt Ultrasound Ea 15 Min      64567 (CPT®) Hc Pt Self Care/Mgmt/Train Ea 15 Min      89233 (CPT®) Hc Pt Prosthetic (S) Train Initial Encounter, Each 15 Min      33297 (CPT®) Hc Pt Orthotic(S)/Prosthetic(S) Encounter, Each 15 Min      57884 (CPT®) Hc Orthotic(S) Mgmt/Train Initial Encounter, Each 15min      Total  53 4        Therapy  Charges for Today     Code Description Service Date Service Provider Modifiers Qty    69982328416 HC PT THER SUPP EA 15 MIN 9/1/2018 Sanjuanita Reddy, ROBERT GP 2    89819975717 HC PT THER PROC EA 15 MIN 9/2/2018 Sanjuanita Reddy PTA GP, KX 2    03446572297 HC GAIT TRAINING EA 15 MIN 9/2/2018 Sanjuanita Reddy PTA GP, KX 2          PT G-Codes  Outcome Measure Options: AM-PAC 6 Clicks Basic Mobility (PT)  Score: 15  Functional Limitation: Mobility: Walking and moving around  Mobility: Walking and Moving Around Current Status (): At least 40 percent but less than 60 percent impaired, limited or restricted  Mobility: Walking and Moving Around Goal Status (): At least 1 percent but less than 20 percent impaired, limited or restricted    Sanjuanita Reddy PTA  9/2/2018

## 2018-09-02 NOTE — PLAN OF CARE
Problem: Patient Care Overview  Goal: Plan of Care Review  Outcome: Ongoing (interventions implemented as appropriate)   09/02/18 0255   Coping/Psychosocial   Plan of Care Reviewed With patient   Plan of Care Review   Progress no change   OTHER   Outcome Summary pt had some break-through pain this shift, has been using her PCA a lot more, VSS excepting O2 which can decline when asleep see chart. dressing to knee c/d/i, PPP, safety maintained.        Problem: Fall Risk (Adult)  Goal: Absence of Fall  Outcome: Ongoing (interventions implemented as appropriate)      Problem: Knee Arthroplasty (Total, Partial) (Adult)  Goal: Signs and Symptoms of Listed Potential Problems Will be Absent, Minimized or Managed (Knee Arthroplasty)  Outcome: Ongoing (interventions implemented as appropriate)

## 2018-09-02 NOTE — ANESTHESIA POSTPROCEDURE EVALUATION
Patient: Mayda Webster    Procedure Summary     Date:  08/31/18 Room / Location:  [facility] OR  /  [facility] OR    Anesthesia Start:  1142 Anesthesia Stop:  1409    Procedure:  TOTAL KNEE ARTHROPLASTY (Left Knee) Diagnosis:  (M17.12)    Surgeon:  Matthew White MD Provider:  Adeel Neil CRNA    Anesthesia Type:  general ASA Status:  3          Anesthesia Type: general  Last vitals  BP   110/71 (09/01/18 1955)   Temp   98.1 °F (36.7 °C) (09/01/18 1955)   Pulse   79 (09/01/18 1955)   Resp   16 (09/01/18 1955)     SpO2   96 % (09/02/18 0100)     Post Anesthesia Care and Evaluation    Patient location during evaluation: PACU  Level of consciousness: awake  Pain management: adequate  Airway patency: patent  Anesthetic complications: No anesthetic complications  PONV Status: none  Cardiovascular status: acceptable  Respiratory status: acceptable  Hydration status: acceptable

## 2018-09-02 NOTE — PROGRESS NOTES
Discharge Planning Assessment  [facility]     Patient Name: Mayda Webster  MRN: [id number]  Today's Date: 9/2/2018    Admit Date: 8/31/2018          Discharge Needs Assessment     Row Name 09/02/18 1118       Living Environment    Lives With alone    Current Living Arrangements home/apartment/condo    Primary Care Provided by self    Provides Primary Care For no one    Family Caregiver if Needed grandchild(werner), adult    Quality of Family Relationships helpful;involved;supportive    Able to Return to Prior Arrangements yes       Resource/Environmental Concerns    Resource/Environmental Concerns none       Transition Planning    Patient/Family Anticipates Transition to home    Patient/Family Anticipated Services at Transition home health care    Transportation Anticipated family or friend will provide       Discharge Needs Assessment    Readmission Within the Last 30 Days no previous admission in last 30 days    Concerns to be Addressed no discharge needs identified    Equipment Currently Used at Home none    Anticipated Changes Related to Illness none    Equipment Needed After Discharge walker, rolling    Discharge Coordination/Progress Referral for hh and rolling walker.  Pt was provided a list of agencies and chose [facility] and [facility].  SW spoke to Ivy at [facility] and faxed referral.  SW spoke to Faizan at [facility] and faxed referral.  Will bring rolling walker to pt's room today.            Discharge Plan    No documentation.       Destination     No service coordination in this encounter.      Durable Medical Equipment     No service coordination in this encounter.      Dialysis/Infusion     No service coordination in this encounter.      Home Medical Care     No service coordination in this encounter.      Social Care     No service coordination in this encounter.        Expected Discharge Date and Time     Expected Discharge Date Expected Discharge Time    Sep 2, 2018               Demographic Summary    No  documentation.           Functional Status    No documentation.           Psychosocial    No documentation.           Abuse/Neglect    No documentation.           Legal    No documentation.           Substance Abuse    No documentation.           Patient Forms    No documentation.         DEDE DialloW

## 2018-09-02 NOTE — DISCHARGE SUMMARY
"[facility]  DISCHARGE SUMMARY       Date of Admission: 8/31/2018  Date of Discharge:  9/2/2018  Primary Care Physician: Mirtha Santos FNP    Presenting Problem/History of Present Illness:  Primary osteoarthritis of left knee [M17.12]     Final Discharge Diagnoses:  Hospital Problem List     Primary osteoarthritis of left knee          Consults: None    Procedures Performed: Left total knee arthroplasty    Pertinent Test Results: Hemoglobin above 10    History of Present Illness on Day of Discharge: Stable on discharge     Hospital Course:  The patient is a 48 y.o. female who presented to [facility] with painful primary osteoarthritis of the left knee.  On the day of admission she is taken to surgery left total knee arthroplasty was carried out.  Postop was done well.  Wound is clean and dry without evidence of infection.  She is discharged in stable condition.        /68 (BP Location: Right arm, Patient Position: Lying)   Pulse 73   Temp 98.1 °F (36.7 °C) (Oral)   Resp 16   Ht 157.5 cm (62\")   Wt 121 kg (266 lb)   LMP  (LMP Unknown)   SpO2 99%   BMI 48.65 kg/m²       Discharge Medications:     Discharge Medications      ASK your doctor about these medications      Instructions Start Date   cyclobenzaprine 10 MG tablet  Commonly known as:  FLEXERIL   10 mg, Oral, Nightly      diclofenac 75 MG EC tablet  Commonly known as:  VOLTAREN   75 mg, Oral, 2 Times Daily      HYDROcodone-acetaminophen 7.5-325 MG per tablet  Commonly known as:  NORCO   1 tablet, Oral, Every 8 Hours PRN      nystatin [id number] UNIT/GM powder  Commonly known as:  MYCOSTATIN   1 application, Topical, Daily PRN      phentermine 37.5 MG capsule   37.5 mg, Oral, Every Morning      pregabalin 50 MG capsule  Commonly known as:  LYRICA   50 mg, Oral, Daily      traMADol 50 MG tablet  Commonly known as:  ULTRAM   50 mg, Oral, Every 8 Hours PRN      traZODone 50 MG tablet  Commonly known as:  DESYREL   50 mg, Oral, " Nightly             Discharge Diet:  regular    Discharge Care Plan/Instructions: #1 she can shower and redress the wound number to she will have home health physical therapy #3 she is to follow-up in the office #4 she will be maintained on aspirin    Follow-up Appointments:   Monday, September 10      Matthew White MD  09/02/18  10:24 AM

## 2018-09-02 NOTE — DISCHARGE PLACEMENT REQUEST
"To:  [facility]  From:  Zari Fieldsxon, [facility]  [phone]    Mayda Webster (48 y.o. Female)     Date of Birth Social Security Number Address Home Phone MRN    1970  [address] [address] [phone] [id number]    Rastafari Marital Status          None Single       Admission Date Admission Type Admitting Provider Attending Provider Department, Room/Bed    8/31/18 Elective Matthew White MD Jackson, Stephen H, MD [facility] 3A, 353/1    Discharge Date Discharge Disposition Discharge Destination         Home or Self Care              Attending Provider:  Matthew White MD    Allergies:  Penicillins    Isolation:  None   Infection:  None   Code Status:  CPR    Ht:  157.5 cm (62\")   Wt:  121 kg (266 lb)    Admission Cmt:  None   Principal Problem:  None                Active Insurance as of 8/31/2018     Primary Coverage     Payor Plan Insurance Group Employer/Plan Group    MEDICARE MEDICARE A & B      Payor Plan Address Payor Plan Phone Number Effective From Effective To    [address] [phone] 2/1/2014     [facility] [id number]       Subscriber Name Subscriber Birth Date Member ID       MAYDA WEBSTER 1970 [id number]           Secondary Coverage     Payor Plan Insurance Group Employer/Plan Group    [facility] MEDICAID      Payor Plan Address Payor Plan Phone Number Effective From Effective To    [address] [address] [phone] 7/10/2018     [facility] [address]       Subscriber Name Subscriber Birth Date Member ID       MAYDA WEBSTER 1970 [id number]                 Emergency Contacts      (Rel.) Home Phone Work Phone Mobile Phone    Kimmy Bernal (Daughter) [phone] -- --        Referral to Home Health [[id number]] (Order [id number])   Order   Date: 9/2/2018 Department: [address] Ordering/Authorizing: Matthew White MD   Lab and Collection     Referral to Home Health (Order #[id number]) on 9/2/2018 - Lab and " Collection Information   Order History   Outpatient   Date/Time Action Taken User Additional Information   09/02/18 1029 Sign Matthew White MD    Order Details     Frequency Duration Priority Order Class   None None Routine Internal Referral   Start Date/Time     Start Date   09/02/18   Order Questions     Question Answer Comment   Face to Face Visit Date 9/2/2018    Follow-up Provider for Plan of Care? I treated the patient in an acute care facility and will not continue treatment after discharge.    Follow-up Provider MATTHEW WHITE    Reason/Clinical Findings Total knee    Describe mobility limitations that make leaving home difficult Total knee    Nursing/Therapeutic Services Requested Physical Therapy    Frequency: 1 Week 1           Source Order Set / Preference List     Order Set    IP GEN EXPRESS DISCHARGE [[id number]]   Clinical Indications      ICD-10-CM ICD-9-CM   Primary osteoarthritis of left knee  - Primary     M17.12 715.16   Reprint Order Requisition     AMB REFERRAL TO HOME HEALTH (Order #[id number]) on 9/2/18   Encounter-Level Cardiology Documents:     There are no encounter-level cardiology documents.   Encounter     View Encounter       Order Provider Info         Office phone Pager E-mail   Ordering User Matthew White [phone] -- --   Authorizing Provider Matthew White [phone] -- --   Attending Provider Matthew White [phone] -- --   Linked Charges     No charges linked to this procedure   Order Transmittal Tracking     AMB REFERRAL TO HOME HEALTH (Order #[id number]) on 9/2/18   Authorized by:  Matthew White MD  (NPI: [id number])       Lab Component SmartPhrase Guide     AMB REFERRAL TO HOME HEALTH (Order #[id number]) on 9/2/18            History & Physical      H&P filed by Matthew White MD at 8/31/2018 10:36 AM        I have reviewed the patient's office H & P. No changes need to be made.     Scan on 8/31/2018 : HISTORY /PHYSICAL  ORTHOPAEDIC - DR FOSTER 8/31/18 (below)            Electronically signed by Matthew White MD at 8/31/2018 10:36 AM       Prior to Admission Medications     Prescriptions Last Dose Informant Patient Reported? Taking?    cyclobenzaprine (FLEXERIL) 10 MG tablet 8/13/2018 Self Yes Yes    Take 10 mg by mouth Every Night.    diclofenac (VOLTAREN) 75 MG EC tablet 8/13/2018 Self Yes Yes    Take 75 mg by mouth 2 (Two) Times a Day.    nystatin (MYCOSTATIN) 973872 UNIT/GM powder 8/13/2018 Self Yes Yes    Apply 1 application topically to the appropriate area as directed Daily As Needed.    phentermine 37.5 MG capsule 8/13/2018 Self Yes Yes    Take 37.5 mg by mouth Every Morning.    pregabalin (LYRICA) 50 MG capsule 8/13/2018 Self Yes Yes    Take 50 mg by mouth Daily.    traMADol (ULTRAM) 50 MG tablet 8/13/2018 Self Yes Yes    Take 50 mg by mouth Every 8 (Eight) Hours As Needed for Moderate Pain .    traZODone (DESYREL) 50 MG tablet 8/13/2018 Self Yes Yes    Take 50 mg by mouth Every Night.          Hospital Medications (active)       Dose Frequency Start End    aspirin tablet 325 mg 325 mg Daily 9/1/2018     Sig - Route: Take 1 tablet by mouth Daily. - Oral    docusate sodium (COLACE) capsule 100 mg 100 mg 2 Times Daily PRN 8/31/2018     Sig - Route: Take 1 capsule by mouth 2 (Two) Times a Day As Needed for Constipation. - Oral    HYDROcodone-acetaminophen (NORCO)  MG per tablet 1 tablet 1 tablet Every 4 Hours PRN 8/31/2018 9/10/2018    Sig - Route: Take 1 tablet by mouth Every 4 (Four) Hours As Needed for Moderate Pain . - Oral    lactated ringers infusion 50 mL/hr Continuous PRN 8/31/2018     Sig - Route: Infuse 50 mL/hr into a venous catheter Continuous As Needed (Start Prior to Surgery). - Intravenous    magnesium hydroxide (MILK OF MAGNESIA) suspension 2400 mg/10mL 10 mL 10 mL Daily PRN 8/31/2018     Sig - Route: Take 10 mL by mouth Daily As Needed for Constipation. - Oral    Morphine sulfate PCA 1 mg/mL 30 mL  "syringe  Continuous 8/31/2018 9/14/2018    Sig - Route: Infuse  into a venous catheter Continuous. - Intravenous    naloxone (NARCAN) injection 0.1 mg 0.1 mg Every 5 Minutes PRN 8/31/2018     Sig - Route: Infuse 0.25 mL into a venous catheter Every 5 (Five) Minutes As Needed for Opioid Reversal or Respiratory Depression (see administration instructions). - Intravenous    ondansetron (ZOFRAN) injection 4 mg 4 mg Every 6 Hours PRN 8/31/2018     Sig - Route: Infuse 2 mL into a venous catheter Every 6 (Six) Hours As Needed for Nausea or Vomiting. - Intravenous    Linked Group 1:  \"Or\" Linked Group Details        ondansetron (ZOFRAN) tablet 4 mg 4 mg Every 6 Hours PRN 8/31/2018     Sig - Route: Take 1 tablet by mouth Every 6 (Six) Hours As Needed for Nausea or Vomiting. - Oral    Linked Group 1:  \"Or\" Linked Group Details        ondansetron ODT (ZOFRAN-ODT) disintegrating tablet 4 mg 4 mg Every 6 Hours PRN 8/31/2018     Sig - Route: Take 1 tablet by mouth Every 6 (Six) Hours As Needed for Nausea or Vomiting. - Oral    Linked Group 1:  \"Or\" Linked Group Details        pregabalin (LYRICA) capsule 50 mg 50 mg Daily 9/1/2018     Sig - Route: Take 1 capsule by mouth Daily. - Oral             Operative/Procedure Notes (most recent note)      Matthew White MD at 8/31/2018 11:15 AM        [facility]  OP NOTE    Patient Name: Mayda Webster    MRN:  [id number]    SURGEON: Matthew White MD     Date of Procedure: 8/31/2018     PREOPERATIVE DIAGNOSIS:  Primary Osteoarthritis of the Left Knee    POSTOPERATIVE DIAGNOSIS:  Primary Osteoarthritis of the Left Knee    PROCEDURE:  Left total knee Arthroplasty     COMPLICATIONS: None    INDICATIONS/FINDINGS:  Mayda Webster is a 48 y.o. female that has developed significant primary osteoarthritis of the left knee with chronic pain.  It was felt that a total knee replacement was indicated.  The patient understands the risk of bleeding, infections and anesthetic " problems.  The patient asked me to proceed with the procedure.  At surgery, the [facility] triathlon knee system was used, utilizing a #2 left posterior stabilized femoral component with a #1 tibial baseplate with #1 x 9 mm polyethylene posterior stabilized meniscal bearing, with 29 x 9 mm polyethylene patellar button with cement on all components.      DESCRIPTION OF PROCEDURE:  After an adequate level of general anesthesia, the patient's left lower extremity was prepped and draped in the usual fashion.  The extremity was then exsanguinated with an Esmarch bandage and the tourniquet was inflated to 300 mmHg.  A midline incision was then made, followed by a medial parapatellar arthrotomy incision.  The patella was everted and the knee was flexed up.  Adequate releases were carried out.  Significant osteophytes were removed with a rongeur.  Openings were then created in the distal femur and proximal tibia with a drill.  The distal femoral resection guide was placed and pinned into position and a 10 mm thick x 5 degree valgus distal femoral resection was carried out.  The distal femur was incised, and the multi angle distal femoral resection guide was placed and these cuts were then made.  The proximal tibial resection guide was placed and the cut was made for adequate extension and flexion gap balance.  Bone in the posterior compartment was removed with an osteotome and Kocher.  The box cut was made in the distal femur with the guide and saw in the usual fashion.  A trial tibial baseplate was then pinned into position and a trial femoral component was placed after plugging the opening in the femur with cancellous bone.  A trial meniscal bearing was placed.  The knee was placed in full extension.  The undersurface of the patella was then resected, and the drill guide was utilized in the usual fashion.  A trial patellar component was placed and the patient was taken through a range of motion and the components were chosen  and opened.  Everything was removed except for the tibial baseplate and the metaphyseal punch cut was made and then enlarged after removing the trial tibial baseplate.  The final cleanup of the posterior compartment was then carried out with cautery.  The wound was then thoroughly irrigated and sectioned dry.  The knee was flexed up.  Regular viscosity cement was then pressurized into the tibial metaphysis, and the tibial implant was then impacted into position and any excess cement was then removed.  The femoral component was then cemented and impacted into position.  A trial meniscal bearing was placed.  The knee was placed in full extension.  The patella was then cemented and held with a patellar clamp.  After the final cement cleanup and after the cement had hardened, the trial meniscal bearing was removed and the tourniquet was deflated and removed.  Any bleeders were then controlled with cautery.  The knee was then again thoroughly irrigated with saline.  The definitive meniscal bearing was then placed and the knee was taken through a range of motion, and the balance appeared to be excellent.  Closure was then accomplished over a deep and superficial Hemovac drain using #1 Vicryl on the deep layers in an interrupted fashion, followed on the skin.  A dressing was applied.  The patient tolerated the procedure well and was transferred to [facility] in stable condition.    Matthew White MD    8/31/2018  2:01 PM      Electronically signed by Matthew White MD at 8/31/2018  2:02 PM          Physician Progress Notes (most recent note)      Faizan Brock MD at 9/1/2018 10:07 AM            Orthopedic Surgery Left TKRA Progress Note        Mayda Webster  9/1/2018  POD# 1 Day Post-Op    Subjective:     Interval: Dressing changed and drain pulled this a.m. without issue    Objective:     General: A&O x3, NAD, No signs or symptoms of PE.   Wound: clean, dry, intact             Dressing: Clean, Dry, Intact  "  Extremity: Distal NVI, Soft throughout           DVT Exam: Negative Shanelle's sign.                   Data Review:  Vitals:  /69 (BP Location: Left arm, Patient Position: Lying)   Pulse 85   Temp 98.2 °F (36.8 °C) (Oral)   Resp 13   Ht 157.5 cm (62\")   Wt 121 kg (266 lb)   LMP  (LMP Unknown)   SpO2 96%   BMI 48.65 kg/m²    Temp (24hrs), Av °F (36.7 °C), Min:97 °F (36.1 °C), Max:98.8 °F (37.1 °C)      I/O (24Hr):    Intake/Output Summary (Last 24 hours) at 18 1007  Last data filed at 18 0947   Gross per 24 hour   Intake             1740 ml   Output             1300 ml   Net              440 ml       Labs:  Lab Results (last 72 hours)     Procedure Component Value Units Date/Time    Hemoglobin & Hematocrit, Blood [[id number]]  (Abnormal) Collected:  18    Specimen:  Blood Updated:  18     Hemoglobin 11.5 (L) g/dL      Hematocrit 36.3 (L) %           Assessment:     <principal problem not specified>  POD 1 Day Post-Op TOTAL KNEE ARTHROPLASTY (Left)    Plan:      1:  DVT prophylaxis, ICE, elevate  2:  Pain control  3:  Physical therapy/Occupation therapy  4:  Anticipate discharge tomorrow if pain well controlled  5:  WBAT operative extremity    Electronically signed by Faizan Brock MD on 2018 at 10:07 AM        Electronically signed by Faizan Brock MD at 2018 10:08 AM       Consult Notes (most recent note)     No notes of this type exist for this encounter.           Physical Therapy Notes (most recent note)      Sanjuanita Reddy PTA at 2018 10:06 AM  Version 1 of 1         Acute Care - Physical Therapy Treatment Note   Nathaniel     Patient Name: Mayda Webster  : 1970  MRN: [id number]  Today's Date: 2018  Onset of Illness/Injury or Date of Surgery: 18  Date of Referral to PT: 18  Referring Physician: Dr. White    Admit Date: 2018    Visit Dx:    ICD-10-CM ICD-9-CM   1. Osteoarthritis of left knee M17.12 715.96 "   2. Aftercare following left knee joint replacement surgery Z47.1 V54.81    Z96.652 V43.65     Patient Active Problem List   Diagnosis   • Primary osteoarthritis of left knee       Therapy Treatment          Rehabilitation Treatment Summary     Row Name 09/02/18 0835             Treatment Time/Intention    Discipline physical therapy assistant  -TR      Document Type therapy note (daily note)  -TR      Subjective Information complains of;pain  -TR      Mode of Treatment physical therapy  -TR      Patient/Family Observations no family in room  -TR      Patient Effort adequate  -TR      Comment TKR   -TR      Recorded by [TR] Sanjuanita Reddy, ROBERT 09/02/18 0957      Row Name 09/02/18 0835             Cognitive Assessment/Intervention- PT/OT    Personal Safety Interventions elopement precautions initiated;fall prevention program maintained;gait belt;nonskid shoes/slippers when out of bed  -TR      Recorded by [TR] Sanjuanita Reddy, ROBERT 09/02/18 0957      Row Name 09/02/18 0835             Bed Mobility Assessment/Treatment    Supine-Sit [place] (Bed Mobility) conditional independence  -TR      Bed Mobility, Safety Issues decreased use of legs for bridging/pushing  -TR      Assistive Device (Bed Mobility) head of bed elevated;bed rails  -TR      Recorded by [TR] Sanjuanita Reddy, ROBERT 09/02/18 0957      Row Name 09/02/18 0835             Transfer Assessment/Treatment    Transfer Assessment/Treatment stand-sit transfer;sit-stand transfer;toilet transfer  -TR      Maintains Weight-bearing Status (Transfers) able to maintain  -TR      Comment (Transfers) cues to scoot LLE out before sitting down   -TR      Recorded by [TR] Sanjuanita Reddy, ROBERT 09/02/18 0957      Row Name 09/02/18 0835             Sit-Stand Transfer    Sit-Stand [place] (Transfers) verbal cues;contact guard  -TR      Assistive Device (Sit-Stand Transfers) walker, front-wheeled  -TR      Recorded by [TR] Sanjuanita Reddy, PTA  09/02/18 0957      Row Name 09/02/18 0835             Stand-Sit Transfer    Stand-Sit [place] (Transfers) verbal cues;contact guard  -TR      Assistive Device (Stand-Sit Transfers) walker, front-wheeled  -TR      Recorded by [TR] Sanjuanita Reddy, hospitals 09/02/18 0957      Row Name 09/02/18 0835             Toilet Transfer    Type (Toilet Transfer) sit-stand;stand-sit  -TR      [place] Level (Toilet Transfer) verbal cues;contact guard;minimum assist (75% patient effort)  -TR      Assistive Device (Toilet Transfer) walker, front-wheeled  -TR      Recorded by [TR] Sanjuanita Reddy, hospitals 09/02/18 0957      Row Name 09/02/18 0835             Gait/Stairs Assessment/Training    Gait/Stairs Assessment/Training gait/ambulation assistive device;gait/ambulation independence;distance ambulated;gait pattern;gait deviations;maintains weight-bearing status  -TR      [place] Level (Gait) verbal cues;contact guard;stand by assist  -TR      Assistive Device (Gait) walker, front-wheeled  -TR      Distance in Feet (Gait) 40'  -TR      Pattern (Gait) step-to  -TR      Deviations/Abnormal Patterns (Gait) stride length decreased;karis decreased;base of support, wide  -TR      Left Sided Gait Deviations heel strike decreased;forward flexed posture  -TR      Recorded by [TR] Sanjuanita Reddy, hospitals 09/02/18 0957      Row Name 09/02/18 0835             General ROM    GENERAL ROM COMMENTS LLE (TKE Protocol) exercise including AROM LAQ's in chair x7, quad sets, AP's, SAQ x10 reclined in chair, and AAROM heel slides x10.   -TR      Recorded by [TR] Sanjuanita Reddy, PTA 09/02/18 0957      Row Name 09/02/18 0835             Left Lower Ext    Lt Knee Extension/Flexion AROM Extension 7 degrees from neutral, flexion 55 degrees.   -TR      Recorded by [TR] Sanjuanita Reddy, ROBERT 09/02/18 0957      Row Name 09/02/18 0835             Therapeutic Exercise    Lower Extremity (Therapeutic Exercise) gastroc stretch,  left;heel slides, left;quad sets, left;LAQ (long arc quad), left;SAQ (short arc quad), left  -TR      Lower Extremity Range of Motion (Therapeutic Exercise) knee flexion/extension, left;ankle dorsiflexion/plantar flexion, left  -TR      Exercise Type (Therapeutic Exercise) AROM (active range of motion);AAROM (active assistive range of motion);static stretching  -TR      Position (Therapeutic Exercise) --   reclined  -TR      Sets/Reps (Therapeutic Exercise) 1/10  -TR      Expected Outcome (Therapeutic Exercise) increase active range of motion  -TR      Comment (Therapeutic Exercise) standing weight shifting using ru walker   -TR      Recorded by [TR] Sanjuanita Reddy, ROBERT 09/02/18 0957      Row Name 09/02/18 0835             Static Sitting Balance    Level of [place] (Unsupported Sitting, Static Balance) supervision  -TR      Sitting Position (Unsupported Sitting, Static Balance) sitting on edge of bed  -TR      Recorded by [TR] Sanjuanita Reddy PTA 09/02/18 0957      Row Name 09/02/18 0835             Static Standing Balance    Level of [place] (Supported Standing, Static Balance) contact guard assist  -TR      Time Able to Maintain Position (Supported Standing, Static Balance) 1 to 2 minutes  -TR      Assistive Device Utilized (Supported Standing, Static Balance) rolling walker  -TR      Recorded by [TR] Sanjuanita Reddy, PTA 09/02/18 0957      Row Name 09/02/18 0835             Positioning and Restraints    Pre-Treatment Position in bed  -TR      Post Treatment Position chair  -TR      In Chair reclined;call light within reach;encouraged to call for assist;notified nsg  -TR      Recorded by [TR] Sanjuanita Reddy, PTA 09/02/18 0957      Row Name 09/02/18 0835             Pain Scale: Numbers Pre/Post-Treatment    Pain Scale: Numbers, Post-Treatment 3/10  -TR      Pain Location - Side Left  -TR      Pain Location knee  -TR      Recorded by [TR] Sanjuanita Reddy, PTA 09/02/18 0957       Row Name                Wound 08/31/18 1227 Left knee incision    Wound - Properties Group Date first assessed: 08/31/18 [SH] Time first assessed: 1227 [SH] Side: Left [SH] Location: knee [SH] Type: incision [SH] Recorded by:  [] Savana Camp RN 08/31/18 1227      User Key  (r) = Recorded By, (t) = Taken By, (c) = Cosigned By    Initials Name Effective Dates Discipline     Savana Camp RN 08/02/16 -  Nurse    TR Sanjuanita Reddy PTA 08/02/16 -  PT          Wound 08/31/18 1227 Left knee incision (Active)   Dressing Appearance dry;intact 9/2/2018  8:00 AM   Base dressing in place, unable to visualize 9/2/2018  8:00 AM   Drainage Amount none 9/2/2018  8:00 AM   Dressing Care, Wound elastic bandage 9/2/2018  8:00 AM             Physical Therapy Education     Title: PT OT SLP Therapies (Active)     Topic: Physical Therapy (Active)     Point: Mobility training (Done)    Learning Progress Summary     Learner Status Readiness Method Response Comment Documented by    Patient Done Acceptance E VU,NR Educated on standing left weight shifting and importance of weight bearing with new TKA RS 09/01/18 1128          Point: Home exercise program (Done)    Learning Progress Summary     Learner Status Readiness Method Response Comment Documented by    Patient Done Acceptance E,D,TB VU,DU Pt educated on HEP packet POD 2, Educated on gait training and satey TR 09/02/18 0957     Done Acceptance E SELIN,Bed IU Patient educated on baseline TKA HEP packet for POD 1 RS 09/01/18 1128                      User Key     Initials Effective Dates Name Provider Type Discipline    TR 08/02/16 -  Sanjuanita Reddy PTA Physical Therapy Assistant PT    RS 06/19/18 -  Arthur Magana, PT, DPT, OCS Physical Therapist PT                    PT Recommendation and Plan     Plan of Care Reviewed With: patient  Progress: improving  Outcome Summary: Pt reports that she has a rough night last night with difficulty sleeping. Pt  independent with head of bed elevated for supine to sit and CGA-SBA for all other mobility. She did require increased cueing for stand to sit transfer to avoid excessive knee flexion. She also requied min assist for toliet transfer. Pt ambualted with decreased weightbeaaring on LLE, decreased step length on RLE,  and decreased knee flexion, heel strike and toe of on LLE.  Pt able to tolerated 7-10 reps with TKR protocol and measures 55 segrees knee flexion and lacking 7 degrees knee extension today. Pt will need RW at D/C and possibly [facility].           Outcome Measures     Row Name 09/02/18 0835 09/01/18 0956 09/01/18 0824       How much help from another person do you currently need...    Turning from your back to your side while in flat bed without using bedrails? 4  -TR  -- 3  -RS    Moving from lying on back to sitting on the side of a flat bed without bedrails? 4  -TR  -- 3  -RS    Moving to and from a bed to a chair (including a wheelchair)? 3  -TR  -- 2  -RS    Standing up from a chair using your arms (e.g., wheelchair, bedside chair)? 3  -TR  -- 3  -RS    Climbing 3-5 steps with a railing? 2  -TR  -- 2  -RS    To walk in hospital room? 3  -TR  -- 2  -RS    AM-PAC 6 Clicks Score 19  -TR  -- 15  -RS       How much help from another is currently needed...    Putting on and taking off regular lower body clothing?  -- 2  -CH  --    Bathing (including washing, rinsing, and drying)  -- 2  -CH  --    Toileting (which includes using toilet bed pan or urinal)  -- 2  -CH  --    Putting on and taking off regular upper body clothing  -- 4  -CH  --    Taking care of personal grooming (such as brushing teeth)  -- 4  -CH  --    Eating meals  -- 4  -CH  --    Score  -- 18  -CH  --       Functional Assessment    Outcome Measure Options AM-PAC 6 Clicks Basic Mobility (PT)  -TR AM-PAC 6 Clicks Daily Activity (OT)  -CH AM-PAC 6 Clicks Basic Mobility (PT)  -RS      User Key  (r) = Recorded By, (t) = Taken By, (c) = Cosigned By     Initials Name Provider Type    Demetra Santos, OTR/L Occupational Therapist    TR Sanjuanita Reddy PTA Physical Therapy Assistant    Arthur Del Real, PT, DPT, OCS Physical Therapist           Time Calculation:         PT Charges     Row Name 09/02/18 0835             Time Calculation    Start Time 0835  -TR      Stop Time 0928  -TR      Time Calculation (min) 53 min  -TR      PT Received On 09/02/18  -TR      PT Goal Re-Cert Due Date 10/11/18  -TR         Time Calculation- PT    Total Timed Code Minutes- PT 53 minute(s)  -TR         Timed Charges    69073 - PT Therapeutic Exercise Minutes 30  -TR      38152 - Gait Training Minutes  23  -TR        User Key  (r) = Recorded By, (t) = Taken By, (c) = Cosigned By    Initials Name Provider Type    Sanjuanita Snyder PTA Physical Therapy Assistant        Therapy Suggested Charges     Code   Minutes Charges    63443 (CPT®) Hc Pt Neuromusc Re Education Ea 15 Min      22628 (CPT®) Hc Pt Ther Proc Ea 15 Min 30 2    17554 (CPT®) Hc Gait Training Ea 15 Min 23 2    85674 (CPT®) Hc Pt Therapeutic Act Ea 15 Min      37332 (CPT®) Hc Pt Manual Therapy Ea 15 Min      55993 (CPT®) Hc Pt Iontophoresis Ea 15 Min      38464 (CPT®) Hc Pt Elec Stim Ea-Per 15 Min      94601 (CPT®) Hc Pt Ultrasound Ea 15 Min      41744 (CPT®) Hc Pt Self Care/Mgmt/Train Ea 15 Min      41103 (CPT®) Hc Pt Prosthetic (S) Train Initial Encounter, Each 15 Min      72506 (CPT®) Hc Pt Orthotic(S)/Prosthetic(S) Encounter, Each 15 Min      32034 (CPT®) Hc Orthotic(S) Mgmt/Train Initial Encounter, Each 15min      Total  53 4        Therapy Charges for Today     Code Description Service Date Service Provider Modifiers Qty    67813777342 HC PT THER SUPP EA 15 MIN 9/1/2018 Sanjuanita Reddy PTA GP 2    56066076554 HC PT THER PROC EA 15 MIN 9/2/2018 Sanjuanita Reddy PTA GP, KX 2    21881838009 HC GAIT TRAINING EA 15 MIN 9/2/2018 Sanjuanita Reddy PTA GP, KX 2          PT G-Codes  Outcome  "Measure Options: AM-PAC 6 Clicks Basic Mobility (PT)  Score: 15  Functional Limitation: Mobility: Walking and moving around  Mobility: Walking and Moving Around Current Status (): At least 40 percent but less than 60 percent impaired, limited or restricted  Mobility: Walking and Moving Around Goal Status (): At least 1 percent but less than 20 percent impaired, limited or restricted    Sanjuanita Reddy PTA  9/2/2018         Electronically signed by Sanjuanita Reddy PTA at 9/2/2018 10:06 AM            Discharge Summary      Matthew White MD at 9/2/2018 10:24 AM          [facility]  DISCHARGE SUMMARY       Date of Admission: 8/31/2018  Date of Discharge:  9/2/2018  Primary Care Physician: Mirtha Santos FNP    Presenting Problem/History of Present Illness:  Primary osteoarthritis of left knee [M17.12]     Final Discharge Diagnoses:  Hospital Problem List     Primary osteoarthritis of left knee          Consults: None    Procedures Performed: Left total knee arthroplasty    Pertinent Test Results: Hemoglobin above 10    History of Present Illness on Day of Discharge: Stable on discharge     Hospital Course:  The patient is a 48 y.o. female who presented to [facility] with painful primary osteoarthritis of the left knee.  On the day of admission she is taken to surgery left total knee arthroplasty was carried out.  Postop was done well.  Wound is clean and dry without evidence of infection.  She is discharged in stable condition.        /68 (BP Location: Right arm, Patient Position: Lying)   Pulse 73   Temp 98.1 °F (36.7 °C) (Oral)   Resp 16   Ht 157.5 cm (62\")   Wt 121 kg (266 lb)   LMP  (LMP Unknown)   SpO2 99%   BMI 48.65 kg/m²        Discharge Medications:     Discharge Medications      ASK your doctor about these medications      Instructions Start Date   cyclobenzaprine 10 MG tablet  Commonly known as:  FLEXERIL   10 mg, Oral, Nightly    "   diclofenac 75 MG EC tablet  Commonly known as:  VOLTAREN   75 mg, Oral, 2 Times Daily      HYDROcodone-acetaminophen 7.5-325 MG per tablet  Commonly known as:  NORCO   1 tablet, Oral, Every 8 Hours PRN      nystatin 748948 UNIT/GM powder  Commonly known as:  MYCOSTATIN   1 application, Topical, Daily PRN      phentermine 37.5 MG capsule   37.5 mg, Oral, Every Morning      pregabalin 50 MG capsule  Commonly known as:  LYRICA   50 mg, Oral, Daily      traMADol 50 MG tablet  Commonly known as:  ULTRAM   50 mg, Oral, Every 8 Hours PRN      traZODone 50 MG tablet  Commonly known as:  DESYREL   50 mg, Oral, Nightly             Discharge Diet:  regular    Discharge Care Plan/Instructions: #1 she can shower and redress the wound number to she will have home health physical therapy #3 she is to follow-up in the office #4 she will be maintained on aspirin    Follow-up Appointments:   Monday, September 10      Matthew White MD  09/02/18  10:24 AM        Electronically signed by Matthew White MD at 9/2/2018 10:26 AM       Discharge Order     Start     Ordered    09/02/18 1028  Discharge patient  Once     Expected Discharge Date:  09/02/18    Discharge Disposition:  Home or Self Care    Physician of Record for Attribution - Please select from Treatment Team:  MATTHEW WHITE [[id number]]    Review needed by CMO to determine Physician of Record:  No       Question Answer Comment   Physician of Record for Attribution - Please select from Treatment Team MATTHEW WHITE    Review needed by CMO to determine Physician of Record No        09/02/18 1029

## 2018-09-02 NOTE — DISCHARGE PLACEMENT REQUEST
"To:  [employer]  From:  Zari Gomez, [facility]  [phone]    Mayda Webster (48 y.o. Female)     Date of Birth Social Security Number Address Home Phone MRN    1970  [address] [facility] [address] [phone] [id number]    M[employer] Marital Status          None Single       Admission Date Admission Type Admitting Provider Attending Provider Department, Room/Bed    8/31/18 Elective Matthew White MD Jackson, Stephen H, MD [facility] 3A, 353/1    Discharge Date Discharge Disposition Discharge Destination         Home or Self Care              Attending Provider:  Matthew White MD    Allergies:  Penicillins    Isolation:  None   Infection:  None   Code Status:  CPR    Ht:  157.5 cm (62\")   Wt:  121 kg (266 lb)    Admission Cmt:  None   Principal Problem:  None                Active Insurance as of 8/31/2018     Primary Coverage     Payor Plan Insurance Group Employer/Plan Group    MEDICARE MEDICARE A & B      Payor Plan Address Payor Plan Phone Number Effective From Effective To    [address] [phone] 2/1/2014     [facility] [id number]       Subscriber Name Subscriber Birth Date Member ID       MAYDA WEBSTER 1970 [id number]           Secondary Coverage     Payor Plan Insurance Group Employer/Plan Group    [facility] MEDICAID      Payor Plan Address Payor Plan Phone Number Effective From Effective To    [address] [address] [phone] 7/10/2018     [facility] [id number]       Subscriber Name Subscriber Birth Date Member ID       MAYDA WEBSTER 1970 [id number]                 Emergency Contacts      (Rel.) Home Phone Work Phone Mobile Phone    Kimmy Bernal (Daughter) [phone] -- --        Brett [] (Order [id number])   Order   Date: 9/2/2018 Department: [address] Ordering/Authorizing: Matthew White MD   Lab and Collection     Walker (Order #[id number]) on 9/2/2018 - Lab and Collection Information   Order " History   Outpatient   Date/Time Action Taken User Additional Information   09/02/18 1043 Sign Leah Strong RN Ordering Mode: Telephone with readback   Order Details     Frequency Duration Priority Order Class   None None Routine External   Start Date/Time     Start Date   09/02/18   Order Questions     Question Answer Comment   Equipment  Walker Folding with Wheels    Length of Need (99 Months = Lifetime) 99 Months = Lifetime    Note:  Custom values to enter length of need for DME          Verbal Order Info     Action Created on Order Mode Entered by Responsible Provider Signed by Signed on   Ordering 09/02/18 1043 Telephone with readback Leah Strong RN Jackson, Stephen H, MD     Source Order Set / Preference List     Preference List   AMB SUPPLIES [[id number]]   Clinical Indications      ICD-10-CM ICD-9-CM   Primary osteoarthritis of left knee  - Primary     M17.12 715.16   Reprint Order Requisition     WALKER (Order #[id number]) on 9/2/18   Encounter-Level Cardiology Documents:     There are no encounter-level cardiology documents.   Encounter     View Encounter       Order Provider Info         Office phone Pager E-mail   Ordering User Leah Strong RN -- -- --   Authorizing Provider Matthew White [phone] -- --   Attending Provider Mattehw White [phone] -- --   Entered By Leah Strong RN -- -- --   Ordering Provider Matthew White [phone] -- --   Linked Charges     No charges linked to this procedure   Order Transmittal Tracking     WALKER (Order #[id number]) on 9/2/18   Authorized by:  Matthew White MD  (NPI: [id number])       Lab Component SmartPhrase Guide     WALKER (Order #[id number]) on 9/2/18            History & Physical      H&P filed by Matthew White MD at 8/31/2018 10:36 AM        I have reviewed the patient's office H & P. No changes need to be made.     Scan on 8/31/2018 : HISTORY /PHYSICAL ORTHOPAEDIC - DR FOSTER 8/31/18  (below)            Electronically signed by Matthew White MD at 8/31/2018 10:36 AM          Operative/Procedure Notes (most recent note)      Matthew White MD at 8/31/2018 11:15 AM        [facility]  OP NOTE    Patient Name: Mayda Webster    MRN:  [id number]    SURGEON: Matthew White MD     Date of Procedure: 8/31/2018     PREOPERATIVE DIAGNOSIS:  Primary Osteoarthritis of the Left Knee    POSTOPERATIVE DIAGNOSIS:  Primary Osteoarthritis of the Left Knee    PROCEDURE:  Left total knee Arthroplasty     COMPLICATIONS: None    INDICATIONS/FINDINGS:  Mayda Webster is a 48 y.o. female that has developed significant primary osteoarthritis of the left knee with chronic pain.  It was felt that a total knee replacement was indicated.  The patient understands the risk of bleeding, infections and anesthetic problems.  The patient asked me to proceed with the procedure.  At surgery, the Vaultize triathlon knee system was used, utilizing a #2 left posterior stabilized femoral component with a #1 tibial baseplate with #1 x 9 mm polyethylene posterior stabilized meniscal bearing, with 29 x 9 mm polyethylene patellar button with cement on all components.      DESCRIPTION OF PROCEDURE:  After an adequate level of general anesthesia, the patient's left lower extremity was prepped and draped in the usual fashion.  The extremity was then exsanguinated with an Esmarch bandage and the tourniquet was inflated to 300 mmHg.  A midline incision was then made, followed by a medial parapatellar arthrotomy incision.  The patella was everted and the knee was flexed up.  Adequate releases were carried out.  Significant osteophytes were removed with a rongeur.  Openings were then created in the distal femur and proximal tibia with a drill.  The distal femoral resection guide was placed and pinned into position and a 10 mm thick x 5 degree valgus distal femoral resection was carried out.  The distal femur was  incised, and the multi angle distal femoral resection guide was placed and these cuts were then made.  The proximal tibial resection guide was placed and the cut was made for adequate extension and flexion gap balance.  Bone in the posterior compartment was removed with an osteotome and Kocher.  The box cut was made in the distal femur with the guide and saw in the usual fashion.  A trial tibial baseplate was then pinned into position and a trial femoral component was placed after plugging the opening in the femur with cancellous bone.  A trial meniscal bearing was placed.  The knee was placed in full extension.  The undersurface of the patella was then resected, and the drill guide was utilized in the usual fashion.  A trial patellar component was placed and the patient was taken through a range of motion and the components were chosen and opened.  Everything was removed except for the tibial baseplate and the metaphyseal punch cut was made and then enlarged after removing the trial tibial baseplate.  The final cleanup of the posterior compartment was then carried out with cautery.  The wound was then thoroughly irrigated and sectioned dry.  The knee was flexed up.  Regular viscosity cement was then pressurized into the tibial metaphysis, and the tibial implant was then impacted into position and any excess cement was then removed.  The femoral component was then cemented and impacted into position.  A trial meniscal bearing was placed.  The knee was placed in full extension.  The patella was then cemented and held with a patellar clamp.  After the final cement cleanup and after the cement had hardened, the trial meniscal bearing was removed and the tourniquet was deflated and removed.  Any bleeders were then controlled with cautery.  The knee was then again thoroughly irrigated with saline.  The definitive meniscal bearing was then placed and the knee was taken through a range of motion, and the balance appeared  "to be excellent.  Closure was then accomplished over a deep and superficial Hemovac drain using #1 Vicryl on the deep layers in an interrupted fashion, followed on the skin.  A dressing was applied.  The patient tolerated the procedure well and was transferred to [facility] in stable condition.    Matthew White MD    2018  2:01 PM      Electronically signed by Matthew White MD at 2018  2:02 PM          Physician Progress Notes (most recent note)      Faizan Brock MD at 2018 10:07 AM            Orthopedic Surgery Left TKRA Progress Note        Mayda Webster  2018  POD# 1 Day Post-Op    Subjective:     Interval: Dressing changed and drain pulled this a.m. without issue    Objective:     General: A&O x3, NAD, No signs or symptoms of PE.   Wound: clean, dry, intact             Dressing: Clean, Dry, Intact   Extremity: Distal NVI, Soft throughout           DVT Exam: Negative Shanelle's sign.                   Data Review:  Vitals:  /69 (BP Location: Left arm, Patient Position: Lying)   Pulse 85   Temp 98.2 °F (36.8 °C) (Oral)   Resp 13   Ht 157.5 cm (62\")   Wt 121 kg (266 lb)   LMP  (LMP Unknown)   SpO2 96%   BMI 48.65 kg/m²    Temp (24hrs), Av °F (36.7 °C), Min:97 °F (36.1 °C), Max:98.8 °F (37.1 °C)      I/O (24Hr):    Intake/Output Summary (Last 24 hours) at 18 1007  Last data filed at 18 0947   Gross per 24 hour   Intake             1740 ml   Output             1300 ml   Net              440 ml       Labs:  Lab Results (last 72 hours)     Procedure Component Value Units Date/Time    Hemoglobin & Hematocrit, Blood [[id number]]  (Abnormal) Collected:  18 05    Specimen:  Blood Updated:  18 05     Hemoglobin 11.5 (L) g/dL      Hematocrit 36.3 (L) %           Assessment:     <principal problem not specified>  POD 1 Day Post-Op TOTAL KNEE ARTHROPLASTY (Left)    Plan:      1:  DVT prophylaxis, ICE, elevate  2:  Pain control  3:  Physical " therapy/Occupation therapy  4:  Anticipate discharge tomorrow if pain well controlled  5:  WBAT operative extremity    Electronically signed by Faizan Brock MD on 2018 at 10:07 AM        Electronically signed by Faizan Brock MD at 2018 10:08 AM       Consult Notes (most recent note)     No notes of this type exist for this encounter.           Physical Therapy Notes (most recent note)      Sanjuanita Reddy PTA at 2018 10:06 AM  Version 1 of 1         Acute Care - Physical Therapy Treatment Note  [facility]     Patient Name: Mayda Webster  : 1970  MRN: [id number]  Today's Date: 2018  Onset of Illness/Injury or Date of Surgery: 18  Date of Referral to PT: 18  Referring Physician: Dr. White    Admit Date: 2018    Visit Dx:    ICD-10-CM ICD-9-CM   1. Osteoarthritis of left knee M17.12 715.96   2. Aftercare following left knee joint replacement surgery Z47.1 V54.81    Z96.652 V43.65     Patient Active Problem List   Diagnosis   • Primary osteoarthritis of left knee       Therapy Treatment          Rehabilitation Treatment Summary     Row Name 18 0835             Treatment Time/Intention    Discipline physical therapy assistant  -TR      Document Type therapy note (daily note)  -TR      Subjective Information complains of;pain  -TR      Mode of Treatment physical therapy  -TR      Patient/Family Observations no family in room  -TR      Patient Effort adequate  -TR      Comment TKR   -TR      Recorded by [TR] Sanjuanita Reddy PTA 18 0957      Row Name 18 0835             Cognitive Assessment/Intervention- PT/OT    Personal Safety Interventions elopement precautions initiated;fall prevention program maintained;gait belt;nonskid shoes/slippers when out of bed  -TR      Recorded by [TR] Sanjuanita Reddy PTA 18 0957      Row Name 18 0835             Bed Mobility Assessment/Treatment    Supine-Sit [place] (Bed Mobility)  conditional independence  -TR      Bed Mobility, Safety Issues decreased use of legs for bridging/pushing  -TR      Assistive Device (Bed Mobility) head of bed elevated;bed rails  -TR      Recorded by [TR] Sanjuanita Reddy, [facility] 09/02/18 0957      Row Name 09/02/18 0835             Transfer Assessment/Treatment    Transfer Assessment/Treatment stand-sit transfer;sit-stand transfer;toilet transfer  -TR      Maintains Weight-bearing Status (Transfers) able to maintain  -TR      Comment (Transfers) cues to scoot LLE out before sitting down   -TR      Recorded by [TR] Sanjuanita Reddy, [facility] 09/02/18 0957      Row Name 09/02/18 0835             Sit-Stand Transfer    Sit-Stand [place] (Transfers) verbal cues;contact guard  -TR      Assistive Device (Sit-Stand Transfers) walker, front-wheeled  -TR      Recorded by [TR] Sanjuanita Reddy, [facility] 09/02/18 0957      Row Name 09/02/18 0835             Stand-Sit Transfer    Stand-Sit [place] (Transfers) verbal cues;contact guard  -TR      Assistive Device (Stand-Sit Transfers) walker, front-wheeled  -TR      Recorded by [TR] Sanjuanita Reddy, [facility] 09/02/18 0957      Row Name 09/02/18 0835             Toilet Transfer    Type (Toilet Transfer) sit-stand;stand-sit  -TR      [place] Level (Toilet Transfer) verbal cues;contact guard;minimum assist (75% patient effort)  -TR      Assistive Device (Toilet Transfer) walker, front-wheeled  -TR      Recorded by [TR] Sanjuanita Reddy, [facility] 09/02/18 0957      Row Name 09/02/18 0835             Gait/Stairs Assessment/Training    Gait/Stairs Assessment/Training gait/ambulation assistive device;gait/ambulation independence;distance ambulated;gait pattern;gait deviations;maintains weight-bearing status  -TR      [place] Level (Gait) verbal cues;contact guard;stand by assist  -TR      Assistive Device (Gait) walker, front-wheeled  -TR      Distance in Feet (Gait) 40'  -TR      Pattern (Gait) step-to  -TR       Deviations/Abnormal Patterns (Gait) stride length decreased;karis decreased;base of support, wide  -TR      Left Sided Gait Deviations heel strike decreased;forward flexed posture  -TR      Recorded by [TR] Sanjuanita Reddy, ROBERT 09/02/18 0957      Row Name 09/02/18 0835             General ROM    GENERAL ROM COMMENTS LLE (TKE Protocol) exercise including AROM LAQ's in chair x7, quad sets, AP's, SAQ x10 reclined in chair, and AAROM heel slides x10.   -TR      Recorded by [TR] Sanjuanita Reddy, ROBERT 09/02/18 0957      Row Name 09/02/18 0835             Left Lower Ext    Lt Knee Extension/Flexion AROM Extension 7 degrees from neutral, flexion 55 degrees.   -TR      Recorded by [TR] Sanjuanita Reddy, ROBERT 09/02/18 0957      Row Name 09/02/18 0835             Therapeutic Exercise    Lower Extremity (Therapeutic Exercise) gastroc stretch, left;heel slides, left;quad sets, left;LAQ (long arc quad), left;SAQ (short arc quad), left  -TR      Lower Extremity Range of Motion (Therapeutic Exercise) knee flexion/extension, left;ankle dorsiflexion/plantar flexion, left  -TR      Exercise Type (Therapeutic Exercise) AROM (active range of motion);AAROM (active assistive range of motion);static stretching  -TR      Position (Therapeutic Exercise) --   reclined  -TR      Sets/Reps (Therapeutic Exercise) 1/10  -TR      Expected Outcome (Therapeutic Exercise) increase active range of motion  -TR      Comment (Therapeutic Exercise) standing weight shifting using ru walker   -TR      Recorded by [TR] Sanjuanita Reddy, ROBERT 09/02/18 0957      Row Name 09/02/18 0835             Static Sitting Balance    Level of [place] (Unsupported Sitting, Static Balance) supervision  -TR      Sitting Position (Unsupported Sitting, Static Balance) sitting on edge of bed  -TR      Recorded by [TR] Sanjuanita Reddy PTA 09/02/18 0957      Row Name 09/02/18 0835             Static Standing Balance    Level of [place]  (Supported Standing, Static Balance) contact guard assist  -TR      Time Able to Maintain Position (Supported Standing, Static Balance) 1 to 2 minutes  -TR      Assistive Device Utilized (Supported Standing, Static Balance) rolling walker  -TR      Recorded by [TR] Sanjuanita Reddy PTA 09/02/18 0957      Row Name 09/02/18 0835             Positioning and Restraints    Pre-Treatment Position in bed  -TR      Post Treatment Position chair  -TR      In Chair reclined;call light within reach;encouraged to call for assist;notified nsg  -TR      Recorded by [TR] Sanjuanita Reddy PTA 09/02/18 0957      Row Name 09/02/18 0835             Pain Scale: Numbers Pre/Post-Treatment    Pain Scale: Numbers, Post-Treatment 3/10  -TR      Pain Location - Side Left  -TR      Pain Location knee  -TR      Recorded by [TR] Sanjuanita Reddy, ROBERT 09/02/18 0957      Row Name                Wound 08/31/18 1227 Left knee incision    Wound - Properties Group Date first assessed: 08/31/18 [SH] Time first assessed: 1227 [SH] Side: Left [SH] Location: knee [SH] Type: incision [SH] Recorded by:  [SH] Savana Camp RN 08/31/18 1227      User Key  (r) = Recorded By, (t) = Taken By, (c) = Cosigned By    Initials Name Effective Dates Discipline    Savana Angela RN 08/02/16 -  Nurse    TR Sanjuanita Reddy PTA 08/02/16 -  PT          Wound 08/31/18 1227 Left knee incision (Active)   Dressing Appearance dry;intact 9/2/2018  8:00 AM   Base dressing in place, unable to visualize 9/2/2018  8:00 AM   Drainage Amount none 9/2/2018  8:00 AM   Dressing Care, Wound elastic bandage 9/2/2018  8:00 AM             Physical Therapy Education     Title: PT OT SLP Therapies (Active)     Topic: Physical Therapy (Active)     Point: Mobility training (Done)    Learning Progress Summary     Learner Status Readiness Method Response Comment Documented by    Patient Done Acceptance E VU,NR Educated on standing left weight shifting and importance of  weight bearing with new TKA RS 09/01/18 1128          Point: Home exercise program (Done)    Learning Progress Summary     Learner Status Readiness Method Response Comment Documented by    Patient Done Acceptance E,D,TB VU,DU Pt educated on HEP packet POD 2, Educated on gait training and satey TR 09/02/18 0957     Done Acceptance E VU,Bed IU Patient educated on baseline TKA HEP packet for POD 1 RS 09/01/18 1128                      User Key     Initials Effective Dates Name Provider Type Discipline    TR 08/02/16 -  Sanjuanita Reddy, PTA Physical Therapy Assistant PT    RS 06/19/18 -  Arthur Magana, PT, DPT, OCS Physical Therapist PT                    PT Recommendation and Plan     Plan of Care Reviewed With: patient  Progress: improving  Outcome Summary: Pt reports that she has a rough night last night with difficulty sleeping. Pt independent with head of bed elevated for supine to sit and CGA-SBA for all other mobility. She did require increased cueing for stand to sit transfer to avoid excessive knee flexion. She also requied min assist for toliet transfer. Pt ambualted with decreased weightbeaaring on LLE, decreased step length on RLE,  and decreased knee flexion, heel strike and toe of on LLE.  Pt able to tolerated 7-10 reps with TKR protocol and measures 55 segrees knee flexion and lacking 7 degrees knee extension today. Pt will need RW at D/C and possibly BSC.           Outcome Measures     Row Name 09/02/18 0835 09/01/18 0956 09/01/18 0824       How much help from another person do you currently need...    Turning from your back to your side while in flat bed without using bedrails? 4  -TR  -- 3  -RS    Moving from lying on back to sitting on the side of a flat bed without bedrails? 4  -TR  -- 3  -RS    Moving to and from a bed to a chair (including a wheelchair)? 3  -TR  -- 2  -RS    Standing up from a chair using your arms (e.g., wheelchair, bedside chair)? 3  -TR  -- 3  -RS    Climbing 3-5  steps with a railing? 2  -TR  -- 2  -RS    To walk in hospital room? 3  -TR  -- 2  -RS    AM-PAC 6 Clicks Score 19  -TR  -- 15  -RS       How much help from another is currently needed...    Putting on and taking off regular lower body clothing?  -- 2  -CH  --    Bathing (including washing, rinsing, and drying)  -- 2  -CH  --    Toileting (which includes using toilet bed pan or urinal)  -- 2  -CH  --    Putting on and taking off regular upper body clothing  -- 4  -CH  --    Taking care of personal grooming (such as brushing teeth)  -- 4  -CH  --    Eating meals  -- 4  -CH  --    Score  -- 18  -CH  --       Functional Assessment    Outcome Measure Options AM-PAC 6 Clicks Basic Mobility (PT)  -TR AM-PAC 6 Clicks Daily Activity (OT)  -CH AM-PAC 6 Clicks Basic Mobility (PT)  -RS      User Key  (r) = Recorded By, (t) = Taken By, (c) = Cosigned By    Initials Name Provider Type     Demetra Blackwood, OTR/L Occupational Therapist    TR Sanjuanita Reddy PTA Physical Therapy Assistant    RS Arthur Magana, PT, DPT, OCS Physical Therapist           Time Calculation:         PT Charges     Row Name 09/02/18 0835             Time Calculation    Start Time 0835  -TR      Stop Time 0928  -TR      Time Calculation (min) 53 min  -TR      PT Received On 09/02/18  -TR      PT Goal Re-Cert Due Date 10/11/18  -TR         Time Calculation- PT    Total Timed Code Minutes- PT 53 minute(s)  -TR         Timed Charges    82088 - PT Therapeutic Exercise Minutes 30  -TR      19051 - Gait Training Minutes  23  -TR        User Key  (r) = Recorded By, (t) = Taken By, (c) = Cosigned By    Initials Name Provider Type    Sanjuanita Snyder PTA Physical Therapy Assistant        Therapy Suggested Charges     Code   Minutes Charges    84773 (CPT®) Hc Pt Neuromusc Re Education Ea 15 Min      79334 (CPT®) Hc Pt Ther Proc Ea 15 Min 30 2    62251 (CPT®) Hc Gait Training Ea 15 Min 23 2    54507 (CPT®) Hc Pt Therapeutic Act Ea 15 Min       49462 (CPT®) Hc Pt Manual Therapy Ea 15 Min      86494 (CPT®) Hc Pt Iontophoresis Ea 15 Min      04990 (CPT®) Hc Pt Elec Stim Ea-Per 15 Min      53146 (CPT®) Hc Pt Ultrasound Ea 15 Min      43218 (CPT®) Hc Pt Self Care/Mgmt/Train Ea 15 Min      81149 (CPT®) Hc Pt Prosthetic (S) Train Initial Encounter, Each 15 Min      34655 (CPT®) Hc Pt Orthotic(S)/Prosthetic(S) Encounter, Each 15 Min      81986 (CPT®) Hc Orthotic(S) Mgmt/Train Initial Encounter, Each 15min      Total  53 4        Therapy Charges for Today     Code Description Service Date Service Provider Modifiers Qty    37173851390 HC PT THER SUPP EA 15 MIN 9/1/2018 Sanjuanita Reddy PTA GP 2    34839543778 HC PT THER PROC EA 15 MIN 9/2/2018 Sanjuanita Reddy PTA GP, KX 2    22778805263 HC GAIT TRAINING EA 15 MIN 9/2/2018 Sanjuanita Reddy PTA GP, KX 2          PT G-Codes  Outcome Measure Options: AM-PAC 6 Clicks Basic Mobility (PT)  Score: 15  Functional Limitation: Mobility: Walking and moving around  Mobility: Walking and Moving Around Current Status (): At least 40 percent but less than 60 percent impaired, limited or restricted  Mobility: Walking and Moving Around Goal Status (): At least 1 percent but less than 20 percent impaired, limited or restricted    Sanjuanita Reddy PTA  9/2/2018         Electronically signed by Sanjuanita Reddy PTA at 9/2/2018 10:06 AM

## 2018-09-03 NOTE — THERAPY DISCHARGE NOTE
Acute Care - Occupational Therapy Discharge Summary  [facility]     Patient Name: Mayda Webster  : 1970  MRN: [id number]    Today's Date: 9/3/2018  Onset of Illness/Injury or Date of Surgery: 18    Date of Referral to OT: 18  Referring Physician: Dr. White      Admit Date: 2018        OT Recommendation and Plan    Visit Dx:    ICD-10-CM ICD-9-CM   1. Primary osteoarthritis of left knee M17.12 715.16   2. Osteoarthritis of left knee M17.12 715.96   3. Aftercare following left knee joint replacement surgery Z47.1 V54.81    Z96.652 V43.65                     OT Rehab Goals     Row Name 18 0730             Transfer Goal 1 (OT)    Activity/Assistive Device (Transfer Goal 1, OT) sit-to-stand/stand-to-sit;bed-to-chair/chair-to-bed;commode, 3-in-1  -TS      [place] Level/Cues Needed (Transfer Goal 1, OT) conditional independence  -TS      Time Frame (Transfer Goal 1, OT) long term goal (LTG);by discharge  -TS         Transfer Goal 2 (OT)    Activity/Assistive Device (Transfer Goal 2, OT) tub;tub bench  -TS      [place] Level/Cues Needed (Transfer Goal 2, OT) supervision required;verbal cues required  -TS      Time Frame (Transfer Goal 2, OT) long term goal (LTG);by discharge  -TS      Progress/Outcome (Transfer Goal 2, OT) goal not met  -TS         Bathing Goal 1 (OT)    Activity/Assistive Device (Bathing Goal 1, OT) bathing skills, all  -TS      [place] Level/Cues Needed (Bathing Goal 1, OT) minimum assist (75% or more patient effort);verbal cues required  -TS      Time Frame (Bathing Goal 1, OT) long term goal (LTG);by discharge  -TS      Progress/Outcomes (Bathing Goal 1, OT) goal not met  -TS         Dressing Goal 1 (OT)    Activity/Assistive Device (Dressing Goal 1, OT) lower body dressing;reacher;sock-aid  -TS      [place]/Cues Needed (Dressing Goal 1, OT) supervision required;verbal cues required  -TS      Time Frame (Dressing Goal 1, OT) long term goal (LTG);by  discharge  -TS      Progress/Outcome (Dressing Goal 1, OT) goal not met  -TS         Toileting Goal 1 (OT)    Activity/Device (Toileting Goal 1, OT) toileting skills, all;commode, 3-in-1  -TS      [place] Level/Cues Needed (Toileting Goal 1, OT) supervision required  -TS      Time Frame (Toileting Goal 1, OT) long term goal (LTG);by discharge  -TS      Progress/Outcome (Toileting Goal 1, OT) goal not met  -TS        User Key  (r) = Recorded By, (t) = Taken By, (c) = Cosigned By    Initials Name Provider Type Discipline    TS Chelsi Flower, LUI/L Occupational Therapy Assistant OT                Outcome Measures     Row Name 09/02/18 0835 09/01/18 0956 09/01/18 0824       How much help from another person do you currently need...    Turning from your back to your side while in flat bed without using bedrails? 4  -TR  -- 3  -RS    Moving from lying on back to sitting on the side of a flat bed without bedrails? 4  -TR  -- 3  -RS    Moving to and from a bed to a chair (including a wheelchair)? 3  -TR  -- 2  -RS    Standing up from a chair using your arms (e.g., wheelchair, bedside chair)? 3  -TR  -- 3  -RS    Climbing 3-5 steps with a railing? 2  -TR  -- 2  -RS    To walk in hospital room? 3  -TR  -- 2  -RS    AM-PAC 6 Clicks Score 19  -TR  -- 15  -RS       How much help from another is currently needed...    Putting on and taking off regular lower body clothing?  -- 2  -CH  --    Bathing (including washing, rinsing, and drying)  -- 2  -CH  --    Toileting (which includes using toilet bed pan or urinal)  -- 2  -CH  --    Putting on and taking off regular upper body clothing  -- 4  -CH  --    Taking care of personal grooming (such as brushing teeth)  -- 4  -CH  --    Eating meals  -- 4  -CH  --    Score  -- 18  -CH  --       Functional Assessment    Outcome Measure Options AM-PAC 6 Clicks Basic Mobility (PT)  -TR AM-PAC 6 Clicks Daily Activity (OT)  -CH AM-PAC 6 Clicks Basic Mobility (PT)  -RS      User Key   (r) = Recorded By, (t) = Taken By, (c) = Cosigned By    Initials Name Provider Type    Demetra Santos, OTR/L Occupational Therapist    Sanjuanita Snyder, PTA Physical Therapy Assistant    RS Arthur Magana, PT, DPT, OCS Physical Therapist          Therapy Suggested Charges     Code   Minutes Charges    None                 OT Discharge Summary  Reason for Discharge: Discharge from facility  Outcomes Achieved: Refer to plan of care for updates on goals achieved  Discharge Destination: Home with assist, Home with home health      SONAL Arredondo  9/3/2018

## 2018-09-03 NOTE — THERAPY DISCHARGE NOTE
Acute Care - Physical Therapy Discharge Summary  [facility]       Patient Name: Mayda Webster  : 1970  MRN: [id number]    Today's Date: 9/3/2018  Onset of Illness/Injury or Date of Surgery: 18    Date of Referral to PT: 18  Referring Physician: Dr. White      Admit Date: 2018      PT Recommendation and Plan    Visit Dx:    ICD-10-CM ICD-9-CM   1. Primary osteoarthritis of left knee M17.12 715.16   2. Osteoarthritis of left knee M17.12 715.96   3. Aftercare following left knee joint replacement surgery Z47.1 V54.81    Z96.652 V43.65             Outcome Measures     Row Name 18 0835 18 0956 18 0824       How much help from another person do you currently need...    Turning from your back to your side while in flat bed without using bedrails? 4  -TR  -- 3  -RS    Moving from lying on back to sitting on the side of a flat bed without bedrails? 4  -TR  -- 3  -RS    Moving to and from a bed to a chair (including a wheelchair)? 3  -TR  -- 2  -RS    Standing up from a chair using your arms (e.g., wheelchair, bedside chair)? 3  -TR  -- 3  -RS    Climbing 3-5 steps with a railing? 2  -TR  -- 2  -RS    To walk in hospital room? 3  -TR  -- 2  -RS    AM-PAC 6 Clicks Score 19  -TR  -- 15  -RS       How much help from another is currently needed...    Putting on and taking off regular lower body clothing?  -- 2  -CH  --    Bathing (including washing, rinsing, and drying)  -- 2  -CH  --    Toileting (which includes using toilet bed pan or urinal)  -- 2  -CH  --    Putting on and taking off regular upper body clothing  -- 4  -CH  --    Taking care of personal grooming (such as brushing teeth)  -- 4  -CH  --    Eating meals  -- 4  -CH  --    Score  -- 18  -CH  --       Functional Assessment    Outcome Measure Options AM-PAC 6 Clicks Basic Mobility (PT)  -TR AM-PAC 6 Clicks Daily Activity (OT)  -CH AM-PAC 6 Clicks Basic Mobility (PT)  -RS      User Key  (r) = Recorded By, (t) = Taken By,  (c) = Cosigned By    Initials Name Provider Type    CH Demetra Blackwood, OTR/L Occupational Therapist    TR Sanjuanita Reddy, PTA Physical Therapy Assistant    Arthur Del Real, PT, DPT, OCS Physical Therapist            Therapy Suggested Charges     Code   Minutes Charges    82504 (CPT®) Hc Pt Neuromusc Re Education Ea 15 Min      06267 (CPT®) Hc Pt Ther Proc Ea 15 Min 30 2    66676 (CPT®) Hc Gait Training Ea 15 Min 23 2    17597 (CPT®) Hc Pt Therapeutic Act Ea 15 Min      03089 (CPT®) Hc Pt Manual Therapy Ea 15 Min      03136 (CPT®) Hc Pt Iontophoresis Ea 15 Min      70083 (CPT®) Hc Pt Elec Stim Ea-Per 15 Min      39074 (CPT®) Hc Pt Ultrasound Ea 15 Min      74804 (CPT®) Hc Pt Self Care/Mgmt/Train Ea 15 Min      24596 (CPT®) Hc Pt Prosthetic (S) Train Initial Encounter, Each 15 Min      02550 (CPT®) Hc Pt Orthotic(S)/Prosthetic(S) Encounter, Each 15 Min      23063 (CPT®) Hc Orthotic(S) Mgmt/Train Initial Encounter, Each 15min      Total  53 4                PT Rehab Goals     Row Name 09/03/18 0823             Bed Mobility Goal 1 (PT)    Activity/Assistive Device (Bed Mobility Goal 1, PT) bed mobility activities, all  -NW      [place] Level/Cues Needed (Bed Mobility Goal 1, PT) conditional independence  -NW      Time Frame (Bed Mobility Goal 1, PT) by discharge  -NW      Progress/Outcomes (Bed Mobility Goal 1, PT) goal not met  -NW         Transfer Goal 1 (PT)    Activity/Assistive Device (Transfer Goal 1, PT) sit-to-stand/stand-to-sit;bed-to-chair/chair-to-bed;walker, rolling  -NW      [place] Level/Cues Needed (Transfer Goal 1, PT) set-up required;conditional independence  -NW      Time Frame (Transfer Goal 1, PT) by discharge  -NW      Progress/Outcome (Transfer Goal 1, PT) goal not met  -NW         Gait Training Goal 1 (PT)    Activity/Assistive Device (Gait Training Goal 1, PT) gait (walking locomotion);assistive device use;improve balance and speed;increase endurance/gait  distance;decrease asymmetrical patterns;decrease fall risk;diminish gait deviation  -NW      [place] Level (Gait Training Goal 1, PT) set-up required;contact guard assist;standby assist  -NW      Distance (Gait Goal 1, PT) 200  -NW      Time Frame (Gait Training Goal 1, PT) by discharge  -NW      Progress/Outcome (Gait Training Goal 1, PT) goal not met  -NW         ROM Goal 1 (PT)    ROM Goal 1 (PT) Patient will improve (L) knee extension AROM to at least -10 degrees  -NW      Time Frame (ROM Goal 1, PT) by discharge  -NW      Progress/Outcome (ROM Goal 1, PT) goal not met  -NW         ROM Goal 2 (PT)    ROM Goal 2 (PT) Patient will improve (L) knee flexion AROM to >60 degrees  -NW      Time Frame (ROM Goal 2, PT) by discharge  -NW      Progress/Outcome (ROM Goal 2, PT) goal not met  -NW         Patient Education Goal (PT)    Activity (Patient Education Goal, PT) Patient will verbalize and demonstrate discharge HEP  -NW      [place]/Cues/Accuracy (Memory Goal 2, PT) verbalizes understanding;demonstrates adequately;independent  -NW      Time Frame (Patient Education Goal, PT) by discharge  -NW      Progress/Outcome (Patient Education Goal, PT) goal met  -NW        User Key  (r) = Recorded By, (t) = Taken By, (c) = Cosigned By    Initials Name Provider Type Discipline    Paula Leone PTA Physical Therapy Assistant PT              PT Discharge Summary  Anticipated Discharge Disposition (PT): home  Reason for Discharge: Discharge from facility  Outcomes Achieved: Refer to plan of care for updates on goals achieved  Discharge Destination: Home      Paula Rand PTA   9/3/2018

## 2018-09-06 LAB
CYTO UR: NORMAL
LAB AP CASE REPORT: NORMAL
PATH REPORT.FINAL DX SPEC: NORMAL
PATH REPORT.GROSS SPEC: NORMAL

## 2018-10-07 ENCOUNTER — HOSPITAL ENCOUNTER (EMERGENCY)
Dept: HOSPITAL 58 - ED | Age: 48
Discharge: HOME | End: 2018-10-07
Payer: COMMERCIAL

## 2018-10-07 VITALS — BODY MASS INDEX: 46.6 KG/M2

## 2018-10-07 VITALS — TEMPERATURE: 98.1 F | DIASTOLIC BLOOD PRESSURE: 100 MMHG | SYSTOLIC BLOOD PRESSURE: 145 MMHG

## 2018-10-07 DIAGNOSIS — R39.15: ICD-10-CM

## 2018-10-07 DIAGNOSIS — R30.0: Primary | ICD-10-CM

## 2018-10-07 DIAGNOSIS — N39.0: ICD-10-CM

## 2018-10-07 PROCEDURE — 81001 URINALYSIS AUTO W/SCOPE: CPT

## 2018-10-07 PROCEDURE — 99283 EMERGENCY DEPT VISIT LOW MDM: CPT

## 2018-10-07 PROCEDURE — 87086 URINE CULTURE/COLONY COUNT: CPT

## 2018-10-07 PROCEDURE — 96372 THER/PROPH/DIAG INJ SC/IM: CPT

## 2018-10-07 NOTE — ED.PDOC
General


ED Provider: 


Dr. KALANI CARDONA MD





Chief Complaint: Urinary Problem


Stated Complaint: dysuria


Time Seen by Physician: 21:15


Mode of Arrival: Walk-In


Information Source: Patient


Exam Limitations: No limitations


Primary Care Provider: 


JEET PALMER





Nursing and Triage Documentation Reviewed and Agree: Yes


Does patient meet sepsis criteria?: No


If yes, has appropriate treatment been initiated?: Yes


System Inflammatory Response Syndrome: Not Applicable


Sepsis Protocol: 


For patient's 13 years and over:





Temp is 96.8 and below  and greater


Pulse >90 BPM


Resp >20/minute


Acutely Altered Mental Status





Are patient's symptoms suggestive of a new infection, such as:


   -Pneumonia


   -Skin, Soft Tissue


   -Endocarditis


   -UTI


   -Bone, Joint Infection


   -Implantable Device


   -Acute Abdominal Infection


   -Wound Infection


   -Meningitis


   -Blood Stream Catheter Infection


   -Unknown








Review of Systems





- Review Of Systems


Constitutional: Reports: No symptoms


Eyes: Reports: No symptoms


Ears, Nose, Mouth, Throat: Reports: No symptoms


Respiratory: Reports: No symptoms


Cardiac: Reports: No symptoms


GI: Reports: No symptoms


: Reports: No symptoms, Dysuria, Urgency


Musculoskeletal: Reports: No symptoms


Skin: Reports: No symptoms


Neurological: Reports: No symptoms


Endocrine: Reports: No symptoms


Hematologic/Lymphatic: Reports: No symptoms


All Other Systems: Reviewed and Negative





Past Medical History





- Past Medical History


Previously Healthy: Yes (recent Left total knee)


Endocrine: Reports: None


Cardiovascular: Reports: None


Respiratory: Reports: None


Hematological: Reports: None


Gastrointestinal: Reports: None


Genitourinary: Reports: None


Neuro/Psych: Reports: None


Musculoskeletal: Reports: None


Cancer: Reports: None


Last Menstrual Period: PT HAS HAD A HYSTERECTOMY





- Surgical History


General Surgical History: Reports: Other (left totoal knee)





- Family History


Family History: Reports: None





- Social History


Smoking Status: Never smoker


Hx Substance Use: No


Alcohol Screening: None





- Immunizations


Tetanus Shot up to Date: Yes





Physical Exam





- Physical Exam


Appearance: Well-appearing, No pain distress, Well-nourished


Ill-appearing: None


Pain Distress: Mild


Eyes: JIMMY, EOMI, Conjunctiva clear


ENT: Ears normal, Nose normal, Oropharynx normal


Respiratory: Airway patent, Breath sounds clear, Breath sounds equal, 

Respirations nonlabored


Cardiovascular: RRR, Pulses normal, No rub, No murmur


GI/: Soft, Nontender, No masses, Bowel sounds normal, No Organomegaly


Musculoskeletal: Normal strength, ROM intact, No edema, No calf tenderness


Skin: Warm, Dry, Normal color


Neurological: Sensation intact, Motor intact, Reflexes intact, Cranial nerves 

intact, Alert, Oriented


Psychiatric: Affect appropriate, Mood appropriate





Critical Care Note





- Critical Care Note


Total Time (mins): 0





Course





- Course


Orders, Labs, Meds: 


Lab Review











  10/07/18





  21:05


 


Urine Color  Orange


 


Urine Clarity  Clear


 


Urine pH  6.0


 


Ur Specific Gravity  1.020


 


Urine Protein  1+


 


Urine Glucose (UA)  Trace


 


Urine Ketones  Negative


 


Urine Blood  Negative


 


Urine Nitrite  Positive


 


Urine Bilirubin  Negative


 


Urine Urobilinogen  1.0


 


Ur Leukocyte Esterase  1+


 


Urine Microscopic WBC  10-20


 


Ur Squamous Epith Cells  5-10


 


Urine Bacteria  Trace








Orders











 Category Date Time Status


 


 UA [URINALYSIS C & S IF INDICATED] Stat LAB  10/07/18 21:05 Completed


 


 URINE CULTURE Stat LAB  10/07/18 21:49 Received











Vital Signs: 


 











  Temp Pulse Resp BP Pulse Ox


 


 10/07/18 20:44  98.1 F  91 H  20  145/100 H  97














Departure





- Departure


Time of Disposition: 20:00


Disposition: HOME SELF-CARE


Discharge Problem: 


 UTI (urinary tract infection)





Instructions:  Urinary Tract Infection in Women (ED)


Condition: Good


Pt referred to PMD for follow-up: Yes


IPMP verified?: No


Prescriptions: 


Sulfamethoxazole/Trimethoprim [Bactrim Ds 800/160 mg] 1 tab PO Q12HR 5 Days #10 

tablet


Allergies/Adverse Reactions: 


Allergies





Penicillins Adverse Reaction (Verified 10/07/18 20:54)


 Rash








Home Medications: 


Ambulatory Orders





Aspirin 325 mg PO DAILY 10/07/18 


Cyclobenzaprine HCl [Flexeril] 10 mg PO DAILY 10/07/18 


Nystatin [Nystop Powder] 1 applic TP BID PRN 10/07/18 


Sulfamethoxazole/Trimethoprim [Bactrim Ds 800/160 mg] 1 tab PO Q12HR 5 Days #10 

tablet 10/07/18

## 2022-01-04 ENCOUNTER — HOSPITAL ENCOUNTER (OUTPATIENT)
Dept: PREADMISSION TESTING | Age: 52
Discharge: HOME | End: 2022-01-08
Payer: MEDICARE

## 2022-01-04 VITALS — BODY MASS INDEX: 51.53 KG/M2 | HEIGHT: 62 IN | WEIGHT: 280 LBS

## 2022-01-04 LAB
ANION GAP SERPL CALCULATED.3IONS-SCNC: 12 MMOL/L (ref 7–19)
APTT: 34.8 SEC (ref 26–36.2)
BASOPHILS ABSOLUTE: 0 K/UL (ref 0–0.2)
BASOPHILS RELATIVE PERCENT: 0.5 % (ref 0–1)
BUN BLDV-MCNC: 13 MG/DL (ref 6–20)
CALCIUM SERPL-MCNC: 8.8 MG/DL (ref 8.6–10)
CHLORIDE BLD-SCNC: 103 MMOL/L (ref 98–111)
CO2: 24 MMOL/L (ref 22–29)
CREAT SERPL-MCNC: 1.1 MG/DL (ref 0.5–0.9)
EOSINOPHILS ABSOLUTE: 0 K/UL (ref 0–0.6)
EOSINOPHILS RELATIVE PERCENT: 0.8 % (ref 0–5)
GFR AFRICAN AMERICAN: >59
GFR NON-AFRICAN AMERICAN: 52
GLUCOSE BLD-MCNC: 88 MG/DL (ref 74–109)
HCT VFR BLD CALC: 46.4 % (ref 37–47)
HEMOGLOBIN: 14.1 G/DL (ref 12–16)
IMMATURE GRANULOCYTES #: 0 K/UL
INR BLD: 1.06 (ref 0.88–1.18)
LYMPHOCYTES ABSOLUTE: 0.5 K/UL (ref 1.1–4.5)
LYMPHOCYTES RELATIVE PERCENT: 13.2 % (ref 20–40)
MCH RBC QN AUTO: 26.8 PG (ref 27–31)
MCHC RBC AUTO-ENTMCNC: 30.4 G/DL (ref 33–37)
MCV RBC AUTO: 88.2 FL (ref 81–99)
MONOCYTES ABSOLUTE: 0.6 K/UL (ref 0–0.9)
MONOCYTES RELATIVE PERCENT: 15 % (ref 0–10)
NEUTROPHILS ABSOLUTE: 2.8 K/UL (ref 1.5–7.5)
NEUTROPHILS RELATIVE PERCENT: 70.2 % (ref 50–65)
PDW BLD-RTO: 13.6 % (ref 11.5–14.5)
PLATELET # BLD: 172 K/UL (ref 130–400)
PMV BLD AUTO: 11.2 FL (ref 9.4–12.3)
POTASSIUM SERPL-SCNC: 3.8 MMOL/L (ref 3.5–5)
PROTHROMBIN TIME: 14 SEC (ref 12–14.6)
RBC # BLD: 5.26 M/UL (ref 4.2–5.4)
SODIUM BLD-SCNC: 139 MMOL/L (ref 136–145)
WBC # BLD: 3.9 K/UL (ref 4.8–10.8)

## 2022-01-04 PROCEDURE — 93005 ELECTROCARDIOGRAM TRACING: CPT | Performed by: ORTHOPAEDIC SURGERY

## 2022-01-04 PROCEDURE — 80048 BASIC METABOLIC PNL TOTAL CA: CPT

## 2022-01-04 PROCEDURE — 87641 MR-STAPH DNA AMP PROBE: CPT

## 2022-01-04 PROCEDURE — 85025 COMPLETE CBC W/AUTO DIFF WBC: CPT

## 2022-01-04 PROCEDURE — 85610 PROTHROMBIN TIME: CPT

## 2022-01-04 PROCEDURE — 85730 THROMBOPLASTIN TIME PARTIAL: CPT

## 2022-01-04 RX ORDER — PREGABALIN 75 MG/1
75 CAPSULE ORAL 3 TIMES DAILY
COMMUNITY

## 2022-01-04 RX ORDER — TRAMADOL HYDROCHLORIDE 50 MG/1
100 TABLET ORAL EVERY 8 HOURS PRN
Status: ON HOLD | COMMUNITY
End: 2022-02-24 | Stop reason: HOSPADM

## 2022-01-04 RX ORDER — HYDROCODONE BITARTRATE AND ACETAMINOPHEN 5; 325 MG/1; MG/1
1 TABLET ORAL EVERY 8 HOURS PRN
Status: ON HOLD | COMMUNITY
End: 2022-02-24 | Stop reason: HOSPADM

## 2022-01-05 LAB
EKG P AXIS: 42 DEGREES
EKG P-R INTERVAL: 172 MS
EKG Q-T INTERVAL: 376 MS
EKG QRS DURATION: 90 MS
EKG QTC CALCULATION (BAZETT): 398 MS
EKG T AXIS: 23 DEGREES
MRSA SCREEN RT-PCR: NOT DETECTED

## 2022-01-05 PROCEDURE — 93010 ELECTROCARDIOGRAM REPORT: CPT | Performed by: INTERNAL MEDICINE

## 2022-01-24 ENCOUNTER — HOSPITAL ENCOUNTER (OUTPATIENT)
Dept: PREADMISSION TESTING | Age: 52
Discharge: HOME | End: 2022-01-28
Payer: MEDICARE

## 2022-01-24 LAB — SARS-COV-2, PCR: DETECTED

## 2022-01-24 PROCEDURE — U0003 INFECTIOUS AGENT DETECTION BY NUCLEIC ACID (DNA OR RNA); SEVERE ACUTE RESPIRATORY SYNDROME CORONAVIRUS 2 (SARS-COV-2) (CORONAVIRUS DISEASE [COVID-19]), AMPLIFIED PROBE TECHNIQUE, MAKING USE OF HIGH THROUGHPUT TECHNOLOGIES AS DESCRIBED BY CMS-2020-01-R: HCPCS

## 2022-01-24 PROCEDURE — U0005 INFEC AGEN DETEC AMPLI PROBE: HCPCS

## 2022-02-24 ENCOUNTER — ANESTHESIA (OUTPATIENT)
Dept: OPERATING ROOM | Age: 52
End: 2022-02-24
Payer: MEDICARE

## 2022-02-24 ENCOUNTER — ANESTHESIA EVENT (OUTPATIENT)
Dept: OPERATING ROOM | Age: 52
End: 2022-02-24
Payer: MEDICARE

## 2022-02-24 ENCOUNTER — HOSPITAL ENCOUNTER (OUTPATIENT)
Age: 52
Setting detail: OUTPATIENT SURGERY
Discharge: HOME | End: 2022-02-24
Attending: ORTHOPAEDIC SURGERY | Admitting: ORTHOPAEDIC SURGERY
Payer: MEDICARE

## 2022-02-24 VITALS
BODY MASS INDEX: 51.73 KG/M2 | HEIGHT: 61 IN | WEIGHT: 274 LBS | RESPIRATION RATE: 16 BRPM | OXYGEN SATURATION: 97 % | DIASTOLIC BLOOD PRESSURE: 69 MMHG | SYSTOLIC BLOOD PRESSURE: 109 MMHG | TEMPERATURE: 97.7 F | HEART RATE: 54 BPM

## 2022-02-24 VITALS — TEMPERATURE: 97 F | OXYGEN SATURATION: 92 % | DIASTOLIC BLOOD PRESSURE: 70 MMHG | SYSTOLIC BLOOD PRESSURE: 107 MMHG

## 2022-02-24 DIAGNOSIS — M17.11 PRIMARY OSTEOARTHRITIS OF RIGHT KNEE: Primary | ICD-10-CM

## 2022-02-24 PROCEDURE — 3600000015 HC SURGERY LEVEL 5 ADDTL 15MIN: Performed by: ORTHOPAEDIC SURGERY

## 2022-02-24 PROCEDURE — C1713 ANCHOR/SCREW BN/BN,TIS/BN: HCPCS | Performed by: ORTHOPAEDIC SURGERY

## 2022-02-24 PROCEDURE — 64447 NJX AA&/STRD FEMORAL NRV IMG: CPT | Performed by: NURSE ANESTHETIST, CERTIFIED REGISTERED

## 2022-02-24 PROCEDURE — 7100000001 HC PACU RECOVERY - ADDTL 15 MIN: Performed by: ORTHOPAEDIC SURGERY

## 2022-02-24 PROCEDURE — 97116 GAIT TRAINING THERAPY: CPT

## 2022-02-24 PROCEDURE — 3700000000 HC ANESTHESIA ATTENDED CARE: Performed by: ORTHOPAEDIC SURGERY

## 2022-02-24 PROCEDURE — 3600000005 HC SURGERY LEVEL 5 BASE: Performed by: ORTHOPAEDIC SURGERY

## 2022-02-24 PROCEDURE — 6360000002 HC RX W HCPCS: Performed by: ANESTHESIOLOGY

## 2022-02-24 PROCEDURE — 2500000003 HC RX 250 WO HCPCS: Performed by: NURSE ANESTHETIST, CERTIFIED REGISTERED

## 2022-02-24 PROCEDURE — 6360000002 HC RX W HCPCS: Performed by: ORTHOPAEDIC SURGERY

## 2022-02-24 PROCEDURE — 3700000001 HC ADD 15 MINUTES (ANESTHESIA): Performed by: ORTHOPAEDIC SURGERY

## 2022-02-24 PROCEDURE — 2709999900 HC NON-CHARGEABLE SUPPLY: Performed by: ORTHOPAEDIC SURGERY

## 2022-02-24 PROCEDURE — 7100000000 HC PACU RECOVERY - FIRST 15 MIN: Performed by: ORTHOPAEDIC SURGERY

## 2022-02-24 PROCEDURE — 6370000000 HC RX 637 (ALT 250 FOR IP): Performed by: ORTHOPAEDIC SURGERY

## 2022-02-24 PROCEDURE — 7100000010 HC PHASE II RECOVERY - FIRST 15 MIN: Performed by: ORTHOPAEDIC SURGERY

## 2022-02-24 PROCEDURE — C1776 JOINT DEVICE (IMPLANTABLE): HCPCS | Performed by: ORTHOPAEDIC SURGERY

## 2022-02-24 PROCEDURE — 2580000003 HC RX 258: Performed by: ORTHOPAEDIC SURGERY

## 2022-02-24 PROCEDURE — 7100000011 HC PHASE II RECOVERY - ADDTL 15 MIN: Performed by: ORTHOPAEDIC SURGERY

## 2022-02-24 PROCEDURE — 2580000003 HC RX 258: Performed by: NURSE ANESTHETIST, CERTIFIED REGISTERED

## 2022-02-24 PROCEDURE — 97161 PT EVAL LOW COMPLEX 20 MIN: CPT

## 2022-02-24 PROCEDURE — A4217 STERILE WATER/SALINE, 500 ML: HCPCS | Performed by: ORTHOPAEDIC SURGERY

## 2022-02-24 PROCEDURE — 2500000003 HC RX 250 WO HCPCS: Performed by: ANESTHESIOLOGY

## 2022-02-24 PROCEDURE — 2500000003 HC RX 250 WO HCPCS: Performed by: ORTHOPAEDIC SURGERY

## 2022-02-24 PROCEDURE — 6360000002 HC RX W HCPCS: Performed by: NURSE ANESTHETIST, CERTIFIED REGISTERED

## 2022-02-24 DEVICE — COMPONENT FEM SZ 5 NAR R KNEE CO CHROM CEM POST STBL COR: Type: IMPLANTABLE DEVICE | Site: KNEE | Status: FUNCTIONAL

## 2022-02-24 DEVICE — SURFACE ARTC TIB C-D FEM 3-5 THK12MM RT VIVACIT-E CONSTRN: Type: IMPLANTABLE DEVICE | Site: KNEE | Status: FUNCTIONAL

## 2022-02-24 DEVICE — EXTENSION STEM L30MM DIA14MM KNEE TAPR CEM PERSONA: Type: IMPLANTABLE DEVICE | Site: KNEE | Status: FUNCTIONAL

## 2022-02-24 DEVICE — COMPONENT PAT DIA29MM THK8MM KNEE POLY CEM CONVENTIONAL: Type: IMPLANTABLE DEVICE | Site: KNEE | Status: FUNCTIONAL

## 2022-02-24 DEVICE — CEMENT BNE 40GM HI VISC RADPQ FOR REV SURG: Type: IMPLANTABLE DEVICE | Site: KNEE | Status: FUNCTIONAL

## 2022-02-24 DEVICE — PSN TIB STM 5 DEG SZ C R: Type: IMPLANTABLE DEVICE | Site: KNEE | Status: FUNCTIONAL

## 2022-02-24 RX ORDER — OXYCODONE HYDROCHLORIDE 5 MG/1
5 TABLET ORAL EVERY 4 HOURS PRN
Qty: 30 TABLET | Refills: 0 | Status: SHIPPED | OUTPATIENT
Start: 2022-02-24 | End: 2022-02-27

## 2022-02-24 RX ORDER — OXYCODONE HYDROCHLORIDE 5 MG/1
5 TABLET ORAL
Status: COMPLETED | OUTPATIENT
Start: 2022-02-24 | End: 2022-02-24

## 2022-02-24 RX ORDER — ROPIVACAINE HYDROCHLORIDE 5 MG/ML
INJECTION, SOLUTION EPIDURAL; INFILTRATION; PERINEURAL
Status: COMPLETED | OUTPATIENT
Start: 2022-02-24 | End: 2022-02-24

## 2022-02-24 RX ORDER — PROPOFOL 10 MG/ML
INJECTION, EMULSION INTRAVENOUS PRN
Status: DISCONTINUED | OUTPATIENT
Start: 2022-02-24 | End: 2022-02-24 | Stop reason: SDUPTHER

## 2022-02-24 RX ORDER — VANCOMYCIN HYDROCHLORIDE 1 G/20ML
INJECTION, POWDER, LYOPHILIZED, FOR SOLUTION INTRAVENOUS PRN
Status: DISCONTINUED | OUTPATIENT
Start: 2022-02-24 | End: 2022-02-24 | Stop reason: ALTCHOICE

## 2022-02-24 RX ORDER — LABETALOL 20 MG/4 ML (5 MG/ML) INTRAVENOUS SYRINGE
PRN
Status: DISCONTINUED | OUTPATIENT
Start: 2022-02-24 | End: 2022-02-24 | Stop reason: SDUPTHER

## 2022-02-24 RX ORDER — SODIUM CHLORIDE 0.9 % (FLUSH) 0.9 %
5-40 SYRINGE (ML) INJECTION EVERY 12 HOURS SCHEDULED
Status: DISCONTINUED | OUTPATIENT
Start: 2022-02-24 | End: 2022-02-24 | Stop reason: HOSPADM

## 2022-02-24 RX ORDER — HYDROMORPHONE HYDROCHLORIDE 1 MG/ML
0.25 INJECTION, SOLUTION INTRAMUSCULAR; INTRAVENOUS; SUBCUTANEOUS EVERY 5 MIN PRN
Status: DISCONTINUED | OUTPATIENT
Start: 2022-02-24 | End: 2022-02-24 | Stop reason: HOSPADM

## 2022-02-24 RX ORDER — FENTANYL CITRATE 50 UG/ML
25 INJECTION, SOLUTION INTRAMUSCULAR; INTRAVENOUS
Status: DISCONTINUED | OUTPATIENT
Start: 2022-02-24 | End: 2022-02-24 | Stop reason: SDUPTHER

## 2022-02-24 RX ORDER — ROPIVACAINE HYDROCHLORIDE 5 MG/ML
INJECTION, SOLUTION EPIDURAL; INFILTRATION; PERINEURAL
Status: COMPLETED
Start: 2022-02-24 | End: 2022-02-24

## 2022-02-24 RX ORDER — ONDANSETRON 2 MG/ML
4 INJECTION INTRAMUSCULAR; INTRAVENOUS
Status: COMPLETED | OUTPATIENT
Start: 2022-02-24 | End: 2022-02-24

## 2022-02-24 RX ORDER — SODIUM CHLORIDE 0.9 % (FLUSH) 0.9 %
5-40 SYRINGE (ML) INJECTION PRN
Status: DISCONTINUED | OUTPATIENT
Start: 2022-02-24 | End: 2022-02-24 | Stop reason: HOSPADM

## 2022-02-24 RX ORDER — FENTANYL CITRATE 50 UG/ML
25 INJECTION, SOLUTION INTRAMUSCULAR; INTRAVENOUS EVERY 5 MIN PRN
Status: DISCONTINUED | OUTPATIENT
Start: 2022-02-24 | End: 2022-02-24 | Stop reason: HOSPADM

## 2022-02-24 RX ORDER — HYDROMORPHONE HYDROCHLORIDE 1 MG/ML
0.5 INJECTION, SOLUTION INTRAMUSCULAR; INTRAVENOUS; SUBCUTANEOUS EVERY 5 MIN PRN
Status: DISCONTINUED | OUTPATIENT
Start: 2022-02-24 | End: 2022-02-24 | Stop reason: HOSPADM

## 2022-02-24 RX ORDER — DEXAMETHASONE SODIUM PHOSPHATE 10 MG/ML
8 INJECTION, SOLUTION INTRAMUSCULAR; INTRAVENOUS ONCE
Status: DISCONTINUED | OUTPATIENT
Start: 2022-02-24 | End: 2022-02-24 | Stop reason: HOSPADM

## 2022-02-24 RX ORDER — LIDOCAINE HYDROCHLORIDE 10 MG/ML
1 INJECTION, SOLUTION EPIDURAL; INFILTRATION; INTRACAUDAL; PERINEURAL
Status: DISCONTINUED | OUTPATIENT
Start: 2022-02-24 | End: 2022-02-24 | Stop reason: HOSPADM

## 2022-02-24 RX ORDER — DEXAMETHASONE SODIUM PHOSPHATE 10 MG/ML
INJECTION, SOLUTION INTRAMUSCULAR; INTRAVENOUS PRN
Status: DISCONTINUED | OUTPATIENT
Start: 2022-02-24 | End: 2022-02-24 | Stop reason: SDUPTHER

## 2022-02-24 RX ORDER — SODIUM CHLORIDE, SODIUM LACTATE, POTASSIUM CHLORIDE, CALCIUM CHLORIDE 600; 310; 30; 20 MG/100ML; MG/100ML; MG/100ML; MG/100ML
INJECTION, SOLUTION INTRAVENOUS CONTINUOUS PRN
Status: DISCONTINUED | OUTPATIENT
Start: 2022-02-24 | End: 2022-02-24 | Stop reason: SDUPTHER

## 2022-02-24 RX ORDER — APREPITANT 40 MG/1
40 CAPSULE ORAL ONCE
Status: COMPLETED | OUTPATIENT
Start: 2022-02-24 | End: 2022-02-24

## 2022-02-24 RX ORDER — SCOLOPAMINE TRANSDERMAL SYSTEM 1 MG/1
1 PATCH, EXTENDED RELEASE TRANSDERMAL ONCE
Status: DISCONTINUED | OUTPATIENT
Start: 2022-02-24 | End: 2022-02-24 | Stop reason: HOSPADM

## 2022-02-24 RX ORDER — SODIUM CHLORIDE, SODIUM LACTATE, POTASSIUM CHLORIDE, CALCIUM CHLORIDE 600; 310; 30; 20 MG/100ML; MG/100ML; MG/100ML; MG/100ML
INJECTION, SOLUTION INTRAVENOUS CONTINUOUS
Status: DISCONTINUED | OUTPATIENT
Start: 2022-02-24 | End: 2022-02-24 | Stop reason: HOSPADM

## 2022-02-24 RX ORDER — DEXAMETHASONE SODIUM PHOSPHATE 4 MG/ML
4 INJECTION, SOLUTION INTRA-ARTICULAR; INTRALESIONAL; INTRAMUSCULAR; INTRAVENOUS; SOFT TISSUE ONCE
Status: DISCONTINUED | OUTPATIENT
Start: 2022-02-24 | End: 2022-02-24

## 2022-02-24 RX ORDER — CELECOXIB 100 MG/1
100 CAPSULE ORAL ONCE
Status: COMPLETED | OUTPATIENT
Start: 2022-02-24 | End: 2022-02-24

## 2022-02-24 RX ORDER — SODIUM CHLORIDE 9 MG/ML
INJECTION, SOLUTION INTRAVENOUS CONTINUOUS
Status: DISCONTINUED | OUTPATIENT
Start: 2022-02-24 | End: 2022-02-24 | Stop reason: HOSPADM

## 2022-02-24 RX ORDER — ROCURONIUM BROMIDE 10 MG/ML
INJECTION, SOLUTION INTRAVENOUS PRN
Status: DISCONTINUED | OUTPATIENT
Start: 2022-02-24 | End: 2022-02-24 | Stop reason: SDUPTHER

## 2022-02-24 RX ORDER — MIDAZOLAM HYDROCHLORIDE 1 MG/ML
2 INJECTION INTRAMUSCULAR; INTRAVENOUS
Status: DISCONTINUED | OUTPATIENT
Start: 2022-02-24 | End: 2022-02-24 | Stop reason: SDUPTHER

## 2022-02-24 RX ORDER — ONDANSETRON 2 MG/ML
INJECTION INTRAMUSCULAR; INTRAVENOUS PRN
Status: DISCONTINUED | OUTPATIENT
Start: 2022-02-24 | End: 2022-02-24 | Stop reason: SDUPTHER

## 2022-02-24 RX ORDER — LIDOCAINE HYDROCHLORIDE 10 MG/ML
1 INJECTION, SOLUTION EPIDURAL; INFILTRATION; INTRACAUDAL; PERINEURAL
Status: DISCONTINUED | OUTPATIENT
Start: 2022-02-24 | End: 2022-02-24 | Stop reason: SDUPTHER

## 2022-02-24 RX ORDER — ROPIVACAINE HYDROCHLORIDE 2 MG/ML
INJECTION, SOLUTION EPIDURAL; INFILTRATION; PERINEURAL PRN
Status: DISCONTINUED | OUTPATIENT
Start: 2022-02-24 | End: 2022-02-24 | Stop reason: ALTCHOICE

## 2022-02-24 RX ORDER — SODIUM CHLORIDE 9 MG/ML
25 INJECTION, SOLUTION INTRAVENOUS PRN
Status: DISCONTINUED | OUTPATIENT
Start: 2022-02-24 | End: 2022-02-24 | Stop reason: HOSPADM

## 2022-02-24 RX ORDER — FENTANYL CITRATE 50 UG/ML
50 INJECTION, SOLUTION INTRAMUSCULAR; INTRAVENOUS
Status: DISCONTINUED | OUTPATIENT
Start: 2022-02-24 | End: 2022-02-24 | Stop reason: HOSPADM

## 2022-02-24 RX ORDER — FENTANYL CITRATE 50 UG/ML
25 INJECTION, SOLUTION INTRAMUSCULAR; INTRAVENOUS
Status: DISCONTINUED | OUTPATIENT
Start: 2022-02-24 | End: 2022-02-24 | Stop reason: HOSPADM

## 2022-02-24 RX ORDER — MEPERIDINE HYDROCHLORIDE 25 MG/ML
12.5 INJECTION INTRAMUSCULAR; INTRAVENOUS; SUBCUTANEOUS EVERY 5 MIN PRN
Status: DISCONTINUED | OUTPATIENT
Start: 2022-02-24 | End: 2022-02-24 | Stop reason: HOSPADM

## 2022-02-24 RX ORDER — DIPHENHYDRAMINE HYDROCHLORIDE 50 MG/ML
12.5 INJECTION INTRAMUSCULAR; INTRAVENOUS
Status: DISCONTINUED | OUTPATIENT
Start: 2022-02-24 | End: 2022-02-24 | Stop reason: HOSPADM

## 2022-02-24 RX ORDER — OXYCODONE HCL 10 MG/1
10 TABLET, FILM COATED, EXTENDED RELEASE ORAL
Status: COMPLETED | OUTPATIENT
Start: 2022-02-24 | End: 2022-02-24

## 2022-02-24 RX ORDER — FENTANYL CITRATE 50 UG/ML
INJECTION, SOLUTION INTRAMUSCULAR; INTRAVENOUS PRN
Status: DISCONTINUED | OUTPATIENT
Start: 2022-02-24 | End: 2022-02-24 | Stop reason: SDUPTHER

## 2022-02-24 RX ORDER — ASPIRIN 81 MG/1
81 TABLET ORAL 2 TIMES DAILY
Qty: 60 TABLET | Refills: 0 | Status: SHIPPED | OUTPATIENT
Start: 2022-02-24

## 2022-02-24 RX ORDER — ACETAMINOPHEN 500 MG
1000 TABLET ORAL ONCE
Status: COMPLETED | OUTPATIENT
Start: 2022-02-24 | End: 2022-02-24

## 2022-02-24 RX ORDER — FENTANYL CITRATE 50 UG/ML
50 INJECTION, SOLUTION INTRAMUSCULAR; INTRAVENOUS EVERY 5 MIN PRN
Status: DISCONTINUED | OUTPATIENT
Start: 2022-02-24 | End: 2022-02-24 | Stop reason: HOSPADM

## 2022-02-24 RX ORDER — TRANEXAMIC ACID 650 1/1
1950 TABLET ORAL
Status: COMPLETED | OUTPATIENT
Start: 2022-02-24 | End: 2022-02-24

## 2022-02-24 RX ORDER — LIDOCAINE HYDROCHLORIDE 10 MG/ML
INJECTION, SOLUTION EPIDURAL; INFILTRATION; INTRACAUDAL; PERINEURAL PRN
Status: DISCONTINUED | OUTPATIENT
Start: 2022-02-24 | End: 2022-02-24 | Stop reason: SDUPTHER

## 2022-02-24 RX ORDER — PROCHLORPERAZINE EDISYLATE 5 MG/ML
5 INJECTION INTRAMUSCULAR; INTRAVENOUS
Status: DISCONTINUED | OUTPATIENT
Start: 2022-02-24 | End: 2022-02-24 | Stop reason: HOSPADM

## 2022-02-24 RX ORDER — MIDAZOLAM HYDROCHLORIDE 1 MG/ML
2 INJECTION INTRAMUSCULAR; INTRAVENOUS
Status: COMPLETED | OUTPATIENT
Start: 2022-02-24 | End: 2022-02-24

## 2022-02-24 RX ORDER — FENTANYL CITRATE 50 UG/ML
50 INJECTION, SOLUTION INTRAMUSCULAR; INTRAVENOUS
Status: DISCONTINUED | OUTPATIENT
Start: 2022-02-24 | End: 2022-02-24 | Stop reason: SDUPTHER

## 2022-02-24 RX ADMIN — SODIUM CHLORIDE, POTASSIUM CHLORIDE, SODIUM LACTATE AND CALCIUM CHLORIDE: 600; 310; 30; 20 INJECTION, SOLUTION INTRAVENOUS at 07:47

## 2022-02-24 RX ADMIN — OXYCODONE HYDROCHLORIDE 10 MG: 10 TABLET, FILM COATED, EXTENDED RELEASE ORAL at 07:49

## 2022-02-24 RX ADMIN — ROCURONIUM BROMIDE 50 MG: 10 INJECTION, SOLUTION INTRAVENOUS at 09:37

## 2022-02-24 RX ADMIN — SUGAMMADEX 250 MG: 100 INJECTION, SOLUTION INTRAVENOUS at 11:02

## 2022-02-24 RX ADMIN — PROPOFOL 200 MG: 10 INJECTION, EMULSION INTRAVENOUS at 09:37

## 2022-02-24 RX ADMIN — FAMOTIDINE 20 MG: 10 INJECTION, SOLUTION INTRAVENOUS at 08:26

## 2022-02-24 RX ADMIN — APREPITANT 40 MG: 40 CAPSULE ORAL at 08:26

## 2022-02-24 RX ADMIN — TRANEXAMIC ACID 1950 MG: 650 TABLET ORAL at 07:49

## 2022-02-24 RX ADMIN — OXYCODONE HYDROCHLORIDE 5 MG: 5 TABLET ORAL at 12:46

## 2022-02-24 RX ADMIN — CELECOXIB 100 MG: 100 CAPSULE ORAL at 07:49

## 2022-02-24 RX ADMIN — HYDROMORPHONE HYDROCHLORIDE 0.5 MG: 1 INJECTION, SOLUTION INTRAMUSCULAR; INTRAVENOUS; SUBCUTANEOUS at 11:56

## 2022-02-24 RX ADMIN — LABETALOL 20 MG/4 ML (5 MG/ML) INTRAVENOUS SYRINGE 5 MG: at 10:14

## 2022-02-24 RX ADMIN — SODIUM CHLORIDE, SODIUM LACTATE, POTASSIUM CHLORIDE, AND CALCIUM CHLORIDE: 600; 310; 30; 20 INJECTION, SOLUTION INTRAVENOUS at 10:15

## 2022-02-24 RX ADMIN — LIDOCAINE HYDROCHLORIDE 50 MG: 10 INJECTION, SOLUTION EPIDURAL; INFILTRATION; INTRACAUDAL; PERINEURAL at 09:37

## 2022-02-24 RX ADMIN — FENTANYL CITRATE 100 MCG: 50 INJECTION, SOLUTION INTRAMUSCULAR; INTRAVENOUS at 09:37

## 2022-02-24 RX ADMIN — MIDAZOLAM 2 MG: 1 INJECTION INTRAMUSCULAR; INTRAVENOUS at 08:55

## 2022-02-24 RX ADMIN — HYDROMORPHONE HYDROCHLORIDE 0.5 MG: 1 INJECTION, SOLUTION INTRAMUSCULAR; INTRAVENOUS; SUBCUTANEOUS at 11:46

## 2022-02-24 RX ADMIN — FENTANYL CITRATE 50 MCG: 50 INJECTION INTRAMUSCULAR; INTRAVENOUS at 11:33

## 2022-02-24 RX ADMIN — SODIUM CHLORIDE, SODIUM LACTATE, POTASSIUM CHLORIDE, AND CALCIUM CHLORIDE: 600; 310; 30; 20 INJECTION, SOLUTION INTRAVENOUS at 09:30

## 2022-02-24 RX ADMIN — ONDANSETRON 4 MG: 2 INJECTION INTRAMUSCULAR; INTRAVENOUS at 12:46

## 2022-02-24 RX ADMIN — HYDROMORPHONE HYDROCHLORIDE 0.5 MG: 1 INJECTION, SOLUTION INTRAMUSCULAR; INTRAVENOUS; SUBCUTANEOUS at 12:04

## 2022-02-24 RX ADMIN — FENTANYL CITRATE 50 MCG: 50 INJECTION INTRAMUSCULAR; INTRAVENOUS at 11:40

## 2022-02-24 RX ADMIN — ROPIVACAINE HYDROCHLORIDE 20 ML: 5 INJECTION, SOLUTION EPIDURAL; INFILTRATION; PERINEURAL at 09:06

## 2022-02-24 RX ADMIN — DEXAMETHASONE SODIUM PHOSPHATE 10 MG: 10 INJECTION, SOLUTION INTRAMUSCULAR; INTRAVENOUS at 09:37

## 2022-02-24 RX ADMIN — ONDANSETRON 4 MG: 2 INJECTION INTRAMUSCULAR; INTRAVENOUS at 09:37

## 2022-02-24 RX ADMIN — ACETAMINOPHEN 1000 MG: 500 TABLET ORAL at 07:49

## 2022-02-24 RX ADMIN — Medication 3000 MG: at 09:30

## 2022-02-24 ASSESSMENT — PAIN DESCRIPTION - LOCATION
LOCATION: KNEE

## 2022-02-24 ASSESSMENT — PAIN SCALES - GENERAL
PAINLEVEL_OUTOF10: 7
PAINLEVEL_OUTOF10: 6
PAINLEVEL_OUTOF10: 8
PAINLEVEL_OUTOF10: 7
PAINLEVEL_OUTOF10: 10
PAINLEVEL_OUTOF10: 6
PAINLEVEL_OUTOF10: 5
PAINLEVEL_OUTOF10: 10

## 2022-02-24 ASSESSMENT — PAIN DESCRIPTION - ORIENTATION
ORIENTATION: RIGHT

## 2022-02-24 ASSESSMENT — PAIN DESCRIPTION - PAIN TYPE
TYPE: SURGICAL PAIN
TYPE: SURGICAL PAIN

## 2022-02-24 ASSESSMENT — LIFESTYLE VARIABLES: SMOKING_STATUS: 0

## 2022-02-24 ASSESSMENT — PAIN DESCRIPTION - DESCRIPTORS: DESCRIPTORS: ACHING

## 2022-02-24 ASSESSMENT — ENCOUNTER SYMPTOMS: SHORTNESS OF BREATH: 0

## 2022-02-24 NOTE — H&P
[facility] ([facility]) Pre-Operative History and Physical    Patient Name: Marleen Norris  : 1970        Chief Complaint: Right knee pain  History of Present Illness: This patient has had ongoing pain for several weeks/months that has been unresponsive to conservative care which has included injection, therapy, activity modification and presents now for surgery. Past Medical History:       Diagnosis Date    Arthritis     Back    COVID 2022     Past Surgical History:       Procedure Laterality Date    HYSTERECTOMY      TOTAL KNEE ARTHROPLASTY Left     Dr. Kanu Castro       Medications:   Prior to Admission medications    Medication Sig Start Date End Date Taking? Authorizing Provider   VITAMIN D PO Take 2,000 Units by mouth daily   Yes Historical Provider, MD   pregabalin (LYRICA) 75 MG capsule Take 75 mg by mouth 3 times daily. Yes Historical Provider, MD   traMADol (ULTRAM) 50 MG tablet Take 100 mg by mouth every 8 hours as needed for Pain (Dr. Kamini Gonzalez). Yes Historical Provider, MD   HYDROcodone-acetaminophen (NORCO) 5-325 MG per tablet Take 1 tablet by mouth every 8 hours as needed for Pain (Dr. Peewee Segura). Historical Provider, MD       Allergies:  Patient has no known allergies. Social History:   Tobacco:  reports that she has never smoked. She has never used smokeless tobacco.   Alcohol:  reports no history of alcohol use.     Review of Systems:  General: Denies any fever or chills  EYES: Denies any diplopia  ENT: Tinnitus or vertigo  Resp: Denies any shortness of breath, cough or wheezing  Cardiac: Denies any chest pain, palpitations, claudication or edema  GI: Denies any melena, hematochezia, hematemesis or pyrosis  : Denies any frequency, urgency, hesitancy or incontinence  Musculoskeletal: Denies back pain, joint pain, myalgias  Heme: Denies bruising or bleeding easily  Endocrine: Denies any history of diabetes or thyroid disease  Psych: Denies anxiety or depression  Neuro: Denies any focal motor or sensory deficits      Physical Exam:  Vitals: BP (!) 131/92   Pulse 77   Temp 97.3 °F (36.3 °C) (Temporal)   Resp 18   Ht 5' 1\" (1.549 m)   Wt 274 lb (124.3 kg)   SpO2 98%   BMI 51.77 kg/m²   CONSTITUTIONAL: Alert, appropriate, no acute distress. PSYCH: mood and affect are normal with a normal rate and tone of speech  EYES: Non icteric, EOM intact, pupils equal round and reactive to light  ENT: Mucus membranes moist, no oral pharyngeal lesions, nares patent   NECK: Supple, no masses, no JVD, trachea mid line   CHEST/LUNGS: CTA bilaterally, normal respiratory effort   CARDIOVASCULAR: RRR, no murmurs,  2+ DP and radial pulses bilaterally  ABDOMEN: soft, nontender  EXTREMITIES: warm, well perfused, no edema. Right knee joint with mildly reduced range of motion and generalized tenderness. Neurovascular exam normal  SKIN: warm, dry with no rashes or lesions  LYMPH: No cervical or inguinal lymphadenopathy    RADIOLOGY: xrays of rightknee show severe knee arthritis  LABORATORY:    CBC :    Lab Results   Component Value Date    WBC 3.9 01/04/2022    HGB 14.1 01/04/2022    HCT 46.4 01/04/2022     01/04/2022     BMP:   Lab Results   Component Value Date     01/04/2022    K 3.8 01/04/2022     01/04/2022    CO2 24 01/04/2022    BUN 13 01/04/2022    CREATININE 1.1 01/04/2022    CALCIUM 8.8 01/04/2022    GFRAA >59 01/04/2022    LABGLOM 52 01/04/2022    GLUCOSE 88 01/04/2022     PT/INR:    Lab Results   Component Value Date    PROTIME 14.0 01/04/2022    INR 1.06 01/04/2022     U/A: No results found for: NITRITE, WBCUA, RBCUA, BACTERIA  HgBA1c:  No components found for: HGBA1C    IMPRESSION:  Right primary knee osteoarthritis. Most recent office note reviewed and there has been no change in health status. PLAN: Right knee replacement. I explained to the patient/family the patient's diagnosis and operative procedure in detail.  They said they understood basically what was wrong and how I planned to fix it. They understand the expected recovery and the risks which include excessive bleeding, infection, reaction to anesthesia, nerve injury, stiffness, fracture, deformity and dislocation. They then signed an operative consent form. Provider:  Kyree Rai MD  Date: 2/24/2022

## 2022-02-24 NOTE — PROGRESS NOTES
Patient discharged home today with outpatient therapy script. Went over all discharge instructions and new medications with patient's daughter and provided a copy of all new medications to take home. Patient's daughter verbalized understanding. Patient's stability will be assessed by PT and Op Care RN before discharging home.   Electronically signed by Spring Bear RN on 2/24/2022 at 12:35 PM

## 2022-02-24 NOTE — ANESTHESIA PROCEDURE NOTES
Peripheral Block    Patient location during procedure: holding area  Start time: 2/24/2022 9:06 AM  End time: 2/24/2022 9:06 AM  Staffing  Performed: anesthesiologist   Anesthesiologist: Yana Tan DO  Preanesthetic Checklist  Completed: patient identified, IV checked, site marked, risks and benefits discussed, surgical consent, monitors and equipment checked, pre-op evaluation, timeout performed, anesthesia consent given, oxygen available and patient being monitored  Peripheral Block  Patient position: supine  Prep: ChloraPrep  Patient monitoring: cardiac monitor, continuous pulse ox, frequent blood pressure checks and IV access  Block type: Femoral  Laterality: right  Injection technique: single-shot  Guidance: ultrasound guided  Adductor canal  Provider prep: mask and sterile gloves  Needle  Needle type: short-bevel   Needle gauge: 20 G  Needle length: 10 cm  Needle localization: ultrasound guidance  Assessment  Injection assessment: negative aspiration for heme, no paresthesia on injection and local visualized surrounding nerve on ultrasound  Paresthesia pain: none  Slow fractionated injection: yes  Hemodynamics: stable  Additional Notes  Needle was introduced in proximal third of thigh in an  philip-medial to posterior direction. The needle was visualized \" in- plane\" under ultrasound guidance to access the adductor canal in a sub-sartorial fashion. The femoral artery was avoided and 20 cc of 0.5% ropivacaine  was injected and surrounded the nerve plexus. The plexus appeared anatomically normal, no obvious pathology was noted.  A permanent image was obtained   Medications Administered  Ropivacaine (NAROPIN) injection 0.5%, 20 mL  Reason for block: post-op pain management

## 2022-02-24 NOTE — ANESTHESIA PRE PROCEDURE
Department of Anesthesiology  Preprocedure Note       Name:  Lizbeth Chou   Age:  45 y.o.  :  1970                                          MRN:  [id number]         Date:  2022      Surgeon: Erika Alas):  Cecille Olszewski, MD    Procedure: Procedure(s):  RIGHT COMPLEX PRIMARY TOTAL KNEE ARTHROPLASTY    Medications prior to admission:   Prior to Admission medications    Medication Sig Start Date End Date Taking? Authorizing Provider   VITAMIN D PO Take 2,000 Units by mouth daily   Yes Historical Provider, MD   pregabalin (LYRICA) 75 MG capsule Take 75 mg by mouth 3 times daily. Yes Historical Provider, MD   traMADol (ULTRAM) 50 MG tablet Take 100 mg by mouth every 8 hours as needed for Pain (Dr. Nestor Nuñez). Yes Historical Provider, MD   HYDROcodone-acetaminophen (NORCO) 5-325 MG per tablet Take 1 tablet by mouth every 8 hours as needed for Pain (Dr. Daniella Lutz). Historical Provider, MD       Current medications:    Current Facility-Administered Medications   Medication Dose Route Frequency Provider Last Rate Last Admin    scopolamine (TRANSDERM-SCOP) transdermal patch 1 patch  1 patch TransDERmal Once Cecille Olszewski, MD   1 patch at 22 0749    dexamethasone (PF) (DECADRON) injection 8 mg  8 mg IntraVENous Once Cecille Olszewski, MD        lactated ringers infusion   IntraVENous Continuous Cecille Olszewski,  mL/hr at 22 0747 New Bag at 22 0747    ceFAZolin (ANCEF) 3000 mg in dextrose 5 % 100 mL IVPB  3,000 mg IntraVENous On Call to [address], [place]           Allergies:  No Known Allergies    Problem List:  There is no problem list on file for this patient.       Past Medical History:        Diagnosis Date    Arthritis     Back    COVID 2022       Past Surgical History:        Procedure Laterality Date    HYSTERECTOMY      TOTAL KNEE ARTHROPLASTY Left 2019    Dr. Heather Negro       Social History:    Social History Tobacco Use    Smoking status: Never Smoker    Smokeless tobacco: Never Used   Substance Use Topics    Alcohol use: Never                                Counseling given: Not Answered      Vital Signs (Current):   Vitals:    02/24/22 0743   BP: (!) 131/92   Pulse: 77   Resp: 18   Temp: 97.3 °F (36.3 °C)   TempSrc: Temporal   SpO2: 98%   Weight: 274 lb (124.3 kg)   Height: 5' 1\" (1.549 m)                                              BP Readings from Last 3 Encounters:   02/24/22 (!) 131/92       NPO Status: Time of last liquid consumption: 0000                        Time of last solid consumption: 0000                        Date of last liquid consumption: 02/23/22                        Date of last solid food consumption: 02/23/22    BMI:   Wt Readings from Last 3 Encounters:   02/24/22 274 lb (124.3 kg)   01/04/22 280 lb (127 kg)     Body mass index is 51.77 kg/m². CBC:   Lab Results   Component Value Date    WBC 3.9 01/04/2022    RBC 5.26 01/04/2022    HGB 14.1 01/04/2022    HCT 46.4 01/04/2022    MCV 88.2 01/04/2022    RDW 13.6 01/04/2022     01/04/2022       CMP:   Lab Results   Component Value Date     01/04/2022    K 3.8 01/04/2022     01/04/2022    CO2 24 01/04/2022    BUN 13 01/04/2022    CREATININE 1.1 01/04/2022    GFRAA >59 01/04/2022    LABGLOM 52 01/04/2022    GLUCOSE 88 01/04/2022    CALCIUM 8.8 01/04/2022       POC Tests: No results for input(s): POCGLU, POCNA, POCK, POCCL, POCBUN, POCHEMO, POCHCT in the last 72 hours.     Coags:   Lab Results   Component Value Date    PROTIME 14.0 01/04/2022    INR 1.06 01/04/2022    APTT 34.8 01/04/2022       HCG (If Applicable): No results found for: PREGTESTUR, PREGSERUM, HCG, HCGQUANT     ABGs: No results found for: PHART, PO2ART, HTG9CQV, UKV8YFH, BEART, H9BWATLQ     Type & Screen (If Applicable):  No results found for: LABABO, LABRH    Drug/Infectious Status (If Applicable):  No results found for: HIV, HEPCAB    COVID-19 Screening (If Applicable):   Lab Results   Component Value Date    COVID19 DETECTED 2022           Anesthesia Evaluation  Patient summary reviewed and Nursing notes reviewed no history of anesthetic complications:   Airway: Mallampati: I  TM distance: >3 FB   Neck ROM: full  Mouth opening: > = 3 FB Dental:          Pulmonary:       (-) shortness of breath, sleep apnea and not a current smoker                           Cardiovascular:  Exercise tolerance: good (>4 METS),       (-) pacemaker, past MI, CAD, CABG/stent, dysrhythmias,  angina and no hyperlipidemia    ECG reviewed               Beta Blocker:  Not on Beta Blocker      ROS comment: EK BPM  Sinus rhythm  Possible anterior infarct - age undetermined  Low QRS voltages in precordial leads  Comparison Summary: No serial comparison made  Summary: Abnormal ECG     Neuro/Psych:      (-) seizures and CVA           GI/Hepatic/Renal:   (+) GERD (mostly food related): well controlled, morbid obesity     (-) liver disease and no renal disease       Endo/Other:    (+) no malignancy/cancer. (-) diabetes mellitus, blood dyscrasia, no malignancy/cancer               Abdominal:   (+) obese,           Vascular:     - DVT and PE. Other Findings:             Anesthesia Plan      general and regional     ASA 2     (Right adductor canal block.)  Induction: intravenous. MIPS: Postoperative opioids intended and Prophylactic antiemetics administered. Anesthetic plan and risks discussed with patient. Use of blood products discussed with patient whom consented to blood products.                    Jac Noyola DO   2022

## 2022-02-24 NOTE — ANESTHESIA POSTPROCEDURE EVALUATION
Department of Anesthesiology  Postprocedure Note    Patient: Rochelle Alanis  MRN: [id number]  YOB: 1970  Date of evaluation: 2/24/2022  Time:  11:20 AM     Procedure Summary     Date: 02/24/22 Room / Location: [address]    Anesthesia Start: 0930 Anesthesia Stop: 1120    Procedure: RIGHT COMPLEX PRIMARY TOTAL KNEE ARTHROPLASTY (Right Knee) Diagnosis: (M17.11)    Surgeons: Ca Philip MD Responsible Provider: SHAY Bautista CRNA    Anesthesia Type: general, regional ASA Status: 2          Anesthesia Type: general, regional    Ghazal Phase I: Ghazal Score: 10    Ghazal Phase II:      Last vitals: Reviewed and per EMR flowsheets.        Anesthesia Post Evaluation    Patient location during evaluation: bedside  Patient participation: complete - patient participated  Level of consciousness: awake and alert  Pain score: 0  Airway patency: patent  Nausea & Vomiting: no nausea  Complications: no  Cardiovascular status: hemodynamically stable  Respiratory status: acceptable  Hydration status: euvolemic

## 2022-02-24 NOTE — OP NOTE
TOTAL KNEE ARTHROPLASTY OPERATIVE NOTE    NAME OF SURGEON / : Laron Dozier MD  PATIENT:   Miguel A Marion  Date: 2/24/2022        Time: 11:03 AM   Referring Physician: ________________________    PREOP DIAGNOSIS:  right Knee  Primary osteoarthritis   POSTOP DIAGNOSIS:  Same     PROCEDURE:    Right  Cemented Posterior Stabilized Knee arthroplasty bmi 52. Complex Primary, please add modifier -22. This procedure required 50 % more effort from the surgeon and staff. The thick adipose layer made the approach to the joint and exposure much more difficult than normal.  Extra deep retractors and more effort were required to maintain the joint in position and allow visualization to perform the procedure. The added weight of the extremity made it extremely difficult to manipulate the joint and to check range of motion and stability. The closure was prolonged due to the added length and depth of the wound. IMPLANTS:   Implant Name Type Inv.  Item Serial No.  Lot No. LRB No. Used Action   CEMENT BNE 40GM HI VISC RADPQ FOR REV SURG - [id number]  CEMENT BNE 40GM HI VISC RADPQ FOR REV SURG  [employer]- [id number] Right 1 Implanted   CEMENT BNE 40GM HI VISC RADPQ FOR REV SURG - [id number]  CEMENT BNE 40GM HI VISC RADPQ FOR REV SURG  [employer]- [id number] Right 1 Implanted   COMPONENT FEM SZ 5 PRAHBA R KNEE CO CHROM OLEGARIO POST STBL COR - [id number]  COMPONENT FEM SZ 5 PRABHA R KNEE CO CHROM OLEGARIO POST STBL COR  [employer]- [id number] Right 1 Implanted   PSN TIB STM 5 DEG SZ C R - [id number]  PSN TIB STM 5 DEG SZ C R  [employer]- [id number] Right 1 Implanted   EXTENSION STEM L30MM [id number] KNEE TAPR OLEGARIO PERSONA - [id number]  EXTENSION STEM L30MM [id number] KNEE TAPR OLEGARIO PERSONA  [employer]- [id number] Right 1 Implanted   COMPONENT PAT QTL54DD THK8MM KNEE POLY OLEGARIO CONVENTIONAL - [id number]  COMPONENT PAT [id number] THK8MM KNEE POLY OLEGARIO CONVENTIONAL  [facility] [id number] Right 1 Implanted   SURFACE ARTC TIB C-D FEM 3-5 [id number] RT VIVACIT-E CONSTRN - [id number]  SURFACE ARTC TIB C-D FEM 3-5 [id number] RT VIVACIT-E CONSTRN  [employer]WD [id number] Right 1 Implanted       FINDINGS:  Preop ROM:  Full except for   -  full  extension  Alignment:    varus  Bone quality:   normal  Cartilage wear:  Medial - severe  Lateral - mild  Pat-fem -moderate  ACL -Intact    ASSISTANT: Bear Yao, certified first assistant. Helped with draping, exposure, retraction, essential steps of the procedure, and with wound closure. ANESTHESIA:  General  EBL:  500 mL  TOURNIQUET:  none  FLUIDS: See anesthesia record  BLOOD PRODUCTS:  None  COMPLICATIONS:  None  SPECIMEN:  None            INDICATIONS:  Patient presents for the above procedure having failed conservative treatment. Patient consents to the procedure above understanding the risks of bleeding, infection, anesthesia, nerve injury, stiffness, and blood clots. PROCEDURE:  The patient was brought to the operating room and placed in a supine position on the operating table. Anesthesia as specified above was placed. An antiobiotic was given IV. The unoperated extremities were well padded. A tourniquet was placed around the proximal thigh. The lower extremity was prepped with chlorhexidene/alcohol and draped sterilely using ioband barriers. A time out was performed. An anterior knee incision was made and fasciocutaneous flaps were developed. A mini midvastus approach was made and the patella subluxed. The prepatellar fat pad, ACL, and the anterior horns of the menisci were excised. A starter drill was placed down the femoral shaft 1 cm anterior to the PCL origin. An alignment milli was placed down the femoral shaft. The distal femoral cutting guide, set at 5 degrees of valgus was then placed over the alignment milli, and pinned perpendicular to the AP axis.   The cutting block was then set to remove an extra 1 mm of distal femur and the distal cut made. The medial bone cut thickness was 10 mm. Hohmans and a PCL retractor were placed around the tibia. The external alignment guide set to cut 10 mm off the intact side at 7° degrees posterior slope was pinned in place. I stepped back and verified that the guide followed the anterior cortex of the tibia to the ankle. The tibial cut was made. Its thickness was 10 mm. The tibial fragment and osteophytes were removed. Posterior meniscal horns were resected. The extension gap was satisfactory. The epicondylar and AP femoral axes were marked with electrocautery. Femoral trials were laid on the distal femur to estimate the appropriate size. The femoral sizer, with posterior paddles set at 3 degrees of external rotation, and an anterior stylus was placed. The sizer reading matched the size predicted by the trial.   The pinholes were drilled for the 4 in 1 femoral cutting block. This block was impacted on the distal femur and sat flush with the proper rotation relative to the AP and epicondylar axes. A drill was advanced through the anterior slot to check for notching. The femoral block was fixed with medial and lateral screws and the remaining femoral cuts were made. Femoral osteophytes were removed with a rongeur. The flexion gap was satisfactory. Posterior femoral osteophytes were removed with a 3/4 inch curved osteotome and rongeur. The appropriate tibial post punch guide covered the tibia without overhang and sat flush. It was lined up with the medial third of the tibial tubercle. The tibia was reamed, then the keel punched. The femoral trial was impacted onto the femur. The box cut was made first with a recip saw and finished with regular saw. The insert for the box was placed. A 10 mm thick, posterior stabilized tibial liner was snapped in place and ROM and stability were checked.    The knee had full ROM and symmetric varus/valgus stability in flexion and extension after *release of  NO RELEASES  * no extra resection was needed   *the appropriate size tibial liner was placed (see above)    Note: Stability to varus/valgus stress(mm):  Extension ( 2  ,  1 ) Mid Flexion (  2  ,  4   ) Flexion (  2  ,  3  )  *A CPS poly wasused to improve stability      The maximum patella thickness was 21 mm. The patella as resected with an oscillating saw and consistent thickness verified with caliper. The trial patella was placed and the overall thickness was restored to 23 mm. A femoral  bone plug placed, the femoral lug holes drilled and the trials were removed. The surfaces were rinsed with pulse lavage and dried. Two batches of cement  were mixed. Appropriate sized implants were cemented in place, a trial tibial liner placed, and the knee held in full extension until cement hardened. The capsule was injected with Ropivacaine. Excess cement was removed, and the actual tibial liner was snapped in place. No thumbs patella tracking was checked. No release was needed. Hemostasis was achieved with electrocautery. The wound was copiously irrigated with antibiotic irrigation. The capsule was closed with interrupted #2 vicryl. Subcutaneous tissue was closed with running 2-0 vicryl. Skin was closed with 3-0 vicryl and Prineo. A sterile dressing was applied. The patient was awakened, extubated and transferred to the recovery room in stable condition.             PLAN:  Full weight bearing, ROM, dvt prophylaxis      Electronically signed by Cass Boyle MD on 2/24/2022 at 11:03 AM

## 2022-02-24 NOTE — PROGRESS NOTES
CLINICAL PHARMACY NOTE: MEDS TO BEDS    Total # of Prescriptions Filled: 2   The following medications were delivered to the patient:  · Aspirin 81mg  · Oxycodone 5mg    Additional Documentation:  The patients daughter paid with cash, the daughter was handed the medications in the patients room in [facility].

## 2022-02-24 NOTE — DISCHARGE INSTR - DIET
Good nutrition is important when healing from an illness, injury, or surgery. Follow any nutrition recommendations given to you during your hospital stay. If you were given an oral nutrition supplement while in the hospital, continue to take this supplement at home. You can take it with meals, in-between meals, and/or before bedtime. These supplements can be purchased at most local grocery stores, pharmacies, and chain [employer]-stores. If you have any questions about your diet or nutrition, call the hospital and ask for the dietitian.             Low carb / High protein

## 2022-02-24 NOTE — PROGRESS NOTES
Physical Therapy  Physical Therapy     Facility/Department: [facility] OR  Initial Assessment  PHYSICAL THERAPY EVALUATION      Erwin Amato    : 1970  MRN: [id number]   PHYSICIAN:  No att. providers found  Primary Problem  There is no problem list on file for this patient.       Rehabilitation Diagnosis:     M17.11 R TKR, FWB      SERVICE DATE: 2022        SUBJECTIVE: Patient states that they are planning on discharging home today    Pain Screening  Patient Currently in Pain: No    PRIOR LEVEL OF FUNCTION:    [x] Independent in community no assistive device     [] Independent in community with assistive device     [] Independent in the house with assistive device     [] Transfer only    []     OBJECTIVE:  Orientation: Within functional Limits      ROM - Passive, Non-operative side     [x] Left lower extremity  [] Right lower extremity       Within functional limits    ROM - Passive, operative side    [] Left lower extremity  [x] Right lower extremity      Hip - extension to 0 degrees and flexion to 80 in sitting    Knee - extension to 0 degrees in supine and flexion to 80 in sitting        STRENGTH - Non-operative side    [x] Left lower extremity  [] Right lower extremity       Within functional limits    STRENGTH - operative side    [] Left lower extremity  [x] Right lower extremity    Grossly  3-/5        TRANSFERS   Sit to stand     [x] CGA [x] Minimum        Bed to chair     [x] CGA [] Minimum    Bed mobility   Supine to sit      [x] CGA [] Minimum      Scoot  [] Side to side  [] Up and down     [x] CGA [] Minimum    AMBULATION   Weight bearing: - WBAT      Distance:     Device: Rolling Walker - The patient has been trained on the use of this equipment     Assistance:       [x] CGA [] Modified Independent [] Stand by / Supervision [] Minimum         BALANCE   Sitting    Good    Standing    Good     ASSESSMENT   Activity limitations: Decreased functional mobility   Patient will benefit from continuing skilled physical therapy to improve mobility    Activity Tolerance  Activity Tolerance: Patient Tolerated treatment well     PLAN        Physical therapy to see 5 X/ week for 2 weeks then reassess. Plan of care to include:   Current Treatment Recommendations: Functional Mobility Training, Transfer Training  Gait Training, Safety Education & Training, Patient/Caregiver Education & Training    PT Education     Precautions; Transfer Training; General Safety; Family Education; Equipment; Weight-bearing Education; Gait Training; Functional Mobility Training  POSITIONING TO PROMOTE KNEE EXTENSION  USE OF GT BELT.   HOW TO PERFORM STEPS.   pT REPORTS ALL QUESTIONS HAVE BEEN ANSWERED    GOALS    Short term goals  Time Frame for Short term goals: 2 weeks  Short term goal 1: Independent with bed mobility and transfers  Short term goal 2: Ambulate 100 feet independently  Short term goal 3: Verbalize understanding of ascending and descending steps    Discharge Recommendations:  Home with assist PRN, Patient would benefit from continued therapy after discharge     Comments:  Patient seen in same day surgery and nursing is actively managing pain    Patient instructed to request assistance before getting up  Nursing updated      Electronically signed by Ginger Paniagua PT on 2/24/2022 at 2:50 PM

## 2022-02-25 ENCOUNTER — TELEPHONE (OUTPATIENT)
Dept: INPATIENT UNIT | Age: 52
End: 2022-02-25

## 2025-01-31 ENCOUNTER — OFFICE VISIT (OUTPATIENT)
Age: 55
End: 2025-01-31
Payer: MEDICARE

## 2025-01-31 VITALS — HEIGHT: 61 IN | WEIGHT: 256.2 LBS | BODY MASS INDEX: 48.37 KG/M2

## 2025-01-31 DIAGNOSIS — Z96.651 PAIN DUE TO TOTAL RIGHT KNEE REPLACEMENT, SUBSEQUENT ENCOUNTER: ICD-10-CM

## 2025-01-31 DIAGNOSIS — M25.561 RIGHT KNEE PAIN, UNSPECIFIED CHRONICITY: Primary | ICD-10-CM

## 2025-01-31 DIAGNOSIS — T84.84XD PAIN DUE TO TOTAL RIGHT KNEE REPLACEMENT, SUBSEQUENT ENCOUNTER: ICD-10-CM

## 2025-01-31 PROCEDURE — 3017F COLORECTAL CA SCREEN DOC REV: CPT | Performed by: ORTHOPAEDIC SURGERY

## 2025-01-31 PROCEDURE — 1036F TOBACCO NON-USER: CPT | Performed by: ORTHOPAEDIC SURGERY

## 2025-01-31 PROCEDURE — G8417 CALC BMI ABV UP PARAM F/U: HCPCS | Performed by: ORTHOPAEDIC SURGERY

## 2025-01-31 PROCEDURE — 99213 OFFICE O/P EST LOW 20 MIN: CPT | Performed by: ORTHOPAEDIC SURGERY

## 2025-01-31 PROCEDURE — G8427 DOCREV CUR MEDS BY ELIG CLIN: HCPCS | Performed by: ORTHOPAEDIC SURGERY

## 2025-01-31 RX ORDER — LORAZEPAM 0.5 MG/1
TABLET ORAL
COMMUNITY
Start: 2024-05-30

## 2025-01-31 RX ORDER — LISINOPRIL 10 MG/1
10 TABLET ORAL DAILY
COMMUNITY
Start: 2024-02-29

## 2025-01-31 RX ORDER — FAMOTIDINE 40 MG/1
TABLET, FILM COATED ORAL
COMMUNITY
Start: 2025-01-11

## 2025-01-31 RX ORDER — ZOLPIDEM TARTRATE 10 MG/1
TABLET ORAL
COMMUNITY
Start: 2024-10-28

## 2025-01-31 RX ORDER — HYDROCODONE BITARTRATE AND ACETAMINOPHEN 5; 325 MG/1; MG/1
1 TABLET ORAL EVERY 8 HOURS PRN
COMMUNITY
Start: 2025-01-11

## 2025-01-31 NOTE — PROGRESS NOTES
[facility]  [facility]  [address]  [facility] [id number] [phone]         Justina Wagoner (: 1970) is a 54 y.o. female, patient, here for evaluation of the following chief complaint(s): Knee Pain (Right (TKR)/SX: 22)  .         Patient's PCP: Nathen Khalil MD     Patient's Last Appointment in this Department was on Visit date not found      Subjective:     Chief Complaint   Patient presents with    Knee Pain     Right (TKR)  SX: 22            History of Present Illness  The patient presents for evaluation of right knee pain.    She underwent a right knee arthroplasty in , which was her first surgical intervention on that knee. The procedure initially provided relief; however, she began experiencing recurrent pain approximately 6 to 7 months ago. The pain is described as a burning, knife-like sensation localized to the anterior aspect of the knee, specifically above the patella. She also reports persistent numbness in the area. Despite the absence of any recent trauma or injury, she has been experiencing instability in the knee during ambulation, necessitating the use of support. Additionally, she notes frequent popping sounds from the knee. She has been attempting weight loss without success. She is currently on a regimen of hydrocodone 5 mg for back pain, but it has not been effective in managing her knee pain.    Supplemental Information  She had a left knee replacement years ago and believes she has arthritis in her left knee, which worsens with rain but is not as severe as the right knee.    MEDICATIONS  Current: Hydrocodone          Medications  Current Outpatient Medications   Medication Sig Dispense Refill    famotidine (PEPCID) 40 MG tablet       HYDROcodone-acetaminophen (NORCO) 5-325 MG per tablet Take 1 tablet by mouth every 8 hours as needed.      lisinopril (PRINIVIL;ZESTRIL) 10 MG tablet Take 1 tablet

## (undated) DEVICE — KT CARTRDG SMARTMIX TOWER W/SNAPOFF NOZL

## (undated) DEVICE — SUTURE VCRL SZ 3-0 L27IN ABSRB UD L19MM PS-2 3/8 CIR PRIM J427H

## (undated) DEVICE — SPONGE LAP W18XL18IN WHT COT 4 PLY FLD STRUNG RADPQ DISP ST

## (undated) DEVICE — TRY PREP SCRB VAG PVP

## (undated) DEVICE — PK KN TOTL 30

## (undated) DEVICE — KT DRN EVAC WND PVC PCH WTROC RND 10F400

## (undated) DEVICE — TUBE ET 7MM NSL ORAL BASIC CUF INTMED MURPHY EYE RADPQ MRK

## (undated) DEVICE — DUAL CUT SAGITTAL BLADE

## (undated) DEVICE — RECIPROCATING BLADE DOUBLE SIDE (74.0 X 0.77MM)

## (undated) DEVICE — COTTON UNDERCAST PADDING,REGULAR FINISH: Brand: WEBRIL

## (undated) DEVICE — DISPOSABLE TOURNIQUET CUFF SINGLE BLADDER, SINGLE PORT AND QUICK CONNECT CONNECTOR: Brand: COLOR CUFF

## (undated) DEVICE — SUTURE ABSRB BRAID COAT UD CP NO 2 27IN VCRL J195H

## (undated) DEVICE — SURGICAL PROCEDURE PACK KNEE TOT DBD CDS LOURDES HOSP LF

## (undated) DEVICE — BIPOLAR SEALER 23-112-1 AQM 6.0: Brand: AQUAMANTYS™

## (undated) DEVICE — 4-PORT MANIFOLD: Brand: NEPTUNE 2

## (undated) DEVICE — ANTIBACTERIAL UNDYED BRAIDED (POLYGLACTIN 910), SYNTHETIC ABSORBABLE SUTURE: Brand: COATED VICRYL

## (undated) DEVICE — CHLORAPREP 26ML ORANGE

## (undated) DEVICE — SHORT LENS-STERILE

## (undated) DEVICE — BLD SAW AGGR 1.27X19X90MM STRL

## (undated) DEVICE — SUT ETHLN 2/0 FSLX 30IN 1674H

## (undated) DEVICE — BLD SAW RECIP DBL SIDED 1.1X68MM STRL

## (undated) DEVICE — DRESSING FOAM W4XL12IN SIL RECT ADH WTRPRF FLM BK W/ BORD

## (undated) DEVICE — GLOVE SURG SZ 85 L12IN FNGR THK79MIL GRN LTX FREE

## (undated) DEVICE — CEMENT MIXING SYSTEM WITH FEMORAL BREAKWAY NOZZLE: Brand: REVOLUTION

## (undated) DEVICE — SOLUTION IRRIG 3000ML 0.9% SOD CHL USP UROMATIC PLAS CONT

## (undated) DEVICE — CLTH CLENS READYCLEANSE PERI CARE PK/5

## (undated) DEVICE — SUT ETHLN 3/0 FSLX 30IN 1673H

## (undated) DEVICE — PK TURNOVER RM ADV

## (undated) DEVICE — CURETTE BONE STRL

## (undated) DEVICE — SUTURE VCRL SZ 2-0 L36IN ABSRB UD L36MM CT-1 1/2 CIR J945H

## (undated) DEVICE — Z DISCONTINUED USE 2272117 DRAPE SURG 3 QTR N INVASIVE 2 LAYR DISP

## (undated) DEVICE — GLV SURG TRIUMPH GREEN W/ALOE PF LTX 8 STRL

## (undated) DEVICE — GOWN,PRECEPT,XLNG/XXLARGE,STRL: Brand: MEDLINE

## (undated) DEVICE — GLOVE SURG SZ 85 CRM LTX FREE POLYISOPRENE POLYMER BEAD ANTI

## (undated) DEVICE — PENCL E/S PLUMEPEN HLSTR 3/8IN 10FT CA/5

## (undated) DEVICE — SYSTEM SKIN CLSR 22CM DERMBND PRINEO

## (undated) DEVICE — DRSNG WND GZ CURAD OIL EMULSION 3X8IN STRL PK/3

## (undated) DEVICE — SUT GUT CHRM 2/0 CT1 36IN 923H

## (undated) DEVICE — BLD SAW RECIP 1.1X80MM STRL

## (undated) DEVICE — GLOVE ORTHO 8   MSG9480

## (undated) DEVICE — BANDAGE COMPR W3INXL15FT BGE E SGL LAYERED CLP CLSR

## (undated) DEVICE — GLV SURG TRIUMPH PF LTX 7.5 STRL

## (undated) DEVICE — LARYNGOSCOPE BLDE MAC HNDL M SZ 35 ST CURAPLEX CURAVIEW LED

## (undated) DEVICE — DRSNG SURESITE WNDW 4X4.5

## (undated) DEVICE — CVR BRD ARM 13X30